# Patient Record
Sex: FEMALE | Race: WHITE | URBAN - METROPOLITAN AREA
[De-identification: names, ages, dates, MRNs, and addresses within clinical notes are randomized per-mention and may not be internally consistent; named-entity substitution may affect disease eponyms.]

---

## 2017-06-27 ENCOUNTER — OFFICE VISIT (OUTPATIENT)
Dept: ORTHOPEDIC SURGERY | Age: 77
End: 2017-06-27

## 2017-06-27 VITALS — HEIGHT: 59 IN | BODY MASS INDEX: 28.62 KG/M2 | WEIGHT: 141.98 LBS

## 2017-06-27 DIAGNOSIS — Z96.612 S/P REVERSE TOTAL SHOULDER ARTHROPLASTY, LEFT: Primary | ICD-10-CM

## 2017-06-27 PROCEDURE — 99213 OFFICE O/P EST LOW 20 MIN: CPT | Performed by: ORTHOPAEDIC SURGERY

## 2017-06-27 PROCEDURE — 73030 X-RAY EXAM OF SHOULDER: CPT | Performed by: ORTHOPAEDIC SURGERY

## 2017-06-27 RX ORDER — TRAMADOL HYDROCHLORIDE 50 MG/1
TABLET ORAL
COMMUNITY
Start: 2015-11-24

## 2021-03-04 ENCOUNTER — IMMUNIZATION (OUTPATIENT)
Dept: PRIMARY CARE CLINIC | Age: 81
End: 2021-03-04
Payer: MEDICARE

## 2021-03-04 PROCEDURE — 91300 COVID-19, PFIZER VACCINE 30MCG/0.3ML DOSE: CPT | Performed by: FAMILY MEDICINE

## 2021-03-04 PROCEDURE — 0001A COVID-19, PFIZER VACCINE 30MCG/0.3ML DOSE: CPT | Performed by: FAMILY MEDICINE

## 2021-03-25 ENCOUNTER — IMMUNIZATION (OUTPATIENT)
Dept: PRIMARY CARE CLINIC | Age: 81
End: 2021-03-25
Payer: MEDICARE

## 2021-03-25 PROCEDURE — 0002A COVID-19, PFIZER VACCINE 30MCG/0.3ML DOSE: CPT | Performed by: FAMILY MEDICINE

## 2021-03-25 PROCEDURE — 91300 COVID-19, PFIZER VACCINE 30MCG/0.3ML DOSE: CPT | Performed by: FAMILY MEDICINE

## 2023-02-23 ENCOUNTER — HOSPITAL ENCOUNTER (INPATIENT)
Age: 83
LOS: 3 days | Discharge: HOME | DRG: 305 | End: 2023-02-26
Attending: STUDENT IN AN ORGANIZED HEALTH CARE EDUCATION/TRAINING PROGRAM | Admitting: HOSPITALIST
Payer: MEDICARE

## 2023-02-23 ENCOUNTER — APPOINTMENT (OUTPATIENT)
Dept: GENERAL RADIOLOGY | Age: 83
DRG: 305 | End: 2023-02-23
Payer: MEDICARE

## 2023-02-23 ENCOUNTER — APPOINTMENT (OUTPATIENT)
Dept: CT IMAGING | Age: 83
DRG: 305 | End: 2023-02-23
Payer: MEDICARE

## 2023-02-23 DIAGNOSIS — J44.1 COPD EXACERBATION (HCC): Primary | ICD-10-CM

## 2023-02-23 DIAGNOSIS — R00.0 TACHYCARDIA: ICD-10-CM

## 2023-02-23 LAB
A/G RATIO: 1.3 (ref 1.1–2.2)
ALBUMIN SERPL-MCNC: 4.2 G/DL (ref 3.4–5)
ALP BLD-CCNC: 112 U/L (ref 40–129)
ALT SERPL-CCNC: 11 U/L (ref 10–40)
AMMONIA: 19 UMOL/L (ref 11–51)
ANION GAP SERPL CALCULATED.3IONS-SCNC: 15 MMOL/L (ref 3–16)
APTT: 26.7 SEC (ref 23–34.3)
AST SERPL-CCNC: 23 U/L (ref 15–37)
BASE EXCESS VENOUS: -0.3 MMOL/L (ref -3–3)
BASOPHILS ABSOLUTE: 0 K/UL (ref 0–0.2)
BASOPHILS RELATIVE PERCENT: 0.5 %
BILIRUB SERPL-MCNC: 0.7 MG/DL (ref 0–1)
BUN BLDV-MCNC: 20 MG/DL (ref 7–20)
CALCIUM IONIZED: 1.13 MMOL/L (ref 1.12–1.32)
CALCIUM SERPL-MCNC: 9.5 MG/DL (ref 8.3–10.6)
CALCIUM SERPL-MCNC: 9.9 MG/DL (ref 8.3–10.6)
CARBOXYHEMOGLOBIN: 1.3 % (ref 0–1.5)
CHLORIDE BLD-SCNC: 97 MMOL/L (ref 99–110)
CO2: 21 MMOL/L (ref 21–32)
CREAT SERPL-MCNC: 0.6 MG/DL (ref 0.6–1.2)
D DIMER: 1.19 UG/ML FEU (ref 0–0.6)
EKG ATRIAL RATE: 121 BPM
EKG DIAGNOSIS: NORMAL
EKG P AXIS: 71 DEGREES
EKG P-R INTERVAL: 156 MS
EKG Q-T INTERVAL: 332 MS
EKG QRS DURATION: 82 MS
EKG QTC CALCULATION (BAZETT): 471 MS
EKG R AXIS: -6 DEGREES
EKG T AXIS: 60 DEGREES
EKG VENTRICULAR RATE: 121 BPM
EOSINOPHILS ABSOLUTE: 0 K/UL (ref 0–0.6)
EOSINOPHILS RELATIVE PERCENT: 0.5 %
GFR SERPL CREATININE-BSD FRML MDRD: >60 ML/MIN/{1.73_M2}
GLUCOSE BLD-MCNC: 139 MG/DL (ref 70–99)
HCO3 VENOUS: 23.9 MMOL/L (ref 23–29)
HCT VFR BLD CALC: 36.8 % (ref 36–48)
HEMOGLOBIN: 12.3 G/DL (ref 12–16)
INR BLD: 0.99 (ref 0.87–1.14)
LACTIC ACID: 1.3 MMOL/L (ref 0.4–2)
LYMPHOCYTES ABSOLUTE: 1.2 K/UL (ref 1–5.1)
LYMPHOCYTES RELATIVE PERCENT: 13.4 %
MAGNESIUM: 1.5 MG/DL (ref 1.8–2.4)
MCH RBC QN AUTO: 30.9 PG (ref 26–34)
MCHC RBC AUTO-ENTMCNC: 33.5 G/DL (ref 31–36)
MCV RBC AUTO: 92.2 FL (ref 80–100)
METHEMOGLOBIN VENOUS: 0.3 %
MONOCYTES ABSOLUTE: 0.4 K/UL (ref 0–1.3)
MONOCYTES RELATIVE PERCENT: 4.5 %
NEUTROPHILS ABSOLUTE: 7.5 K/UL (ref 1.7–7.7)
NEUTROPHILS RELATIVE PERCENT: 81.1 %
O2 SAT, VEN: 91 %
O2 THERAPY: ABNORMAL
PCO2, VEN: 37.6 MMHG (ref 40–50)
PDW BLD-RTO: 13.6 % (ref 12.4–15.4)
PH VENOUS: 7.42 (ref 7.35–7.45)
PH VENOUS: 7.45 (ref 7.35–7.45)
PLATELET # BLD: 330 K/UL (ref 135–450)
PMV BLD AUTO: 7.2 FL (ref 5–10.5)
PO2, VEN: 57.9 MMHG (ref 25–40)
POTASSIUM SERPL-SCNC: 3.8 MMOL/L (ref 3.5–5.1)
PRO-BNP: 324 PG/ML (ref 0–449)
PROCALCITONIN: 0.1 NG/ML (ref 0–0.15)
PROTHROMBIN TIME: 13 SEC (ref 11.7–14.5)
RBC # BLD: 4 M/UL (ref 4–5.2)
SODIUM BLD-SCNC: 133 MMOL/L (ref 136–145)
TCO2 CALC VENOUS: 25 MMOL/L
TOTAL PROTEIN: 7.5 G/DL (ref 6.4–8.2)
TROPONIN: <0.01 NG/ML
TROPONIN: <0.01 NG/ML
WBC # BLD: 9.3 K/UL (ref 4–11)

## 2023-02-23 PROCEDURE — 99285 EMERGENCY DEPT VISIT HI MDM: CPT

## 2023-02-23 PROCEDURE — 6370000000 HC RX 637 (ALT 250 FOR IP): Performed by: HOSPITALIST

## 2023-02-23 PROCEDURE — 6370000000 HC RX 637 (ALT 250 FOR IP): Performed by: PHYSICIAN ASSISTANT

## 2023-02-23 PROCEDURE — 85730 THROMBOPLASTIN TIME PARTIAL: CPT

## 2023-02-23 PROCEDURE — 93005 ELECTROCARDIOGRAM TRACING: CPT | Performed by: PHYSICIAN ASSISTANT

## 2023-02-23 PROCEDURE — 85379 FIBRIN DEGRADATION QUANT: CPT

## 2023-02-23 PROCEDURE — 83605 ASSAY OF LACTIC ACID: CPT

## 2023-02-23 PROCEDURE — 6360000002 HC RX W HCPCS: Performed by: PHYSICIAN ASSISTANT

## 2023-02-23 PROCEDURE — 84484 ASSAY OF TROPONIN QUANT: CPT

## 2023-02-23 PROCEDURE — 93010 ELECTROCARDIOGRAM REPORT: CPT | Performed by: INTERNAL MEDICINE

## 2023-02-23 PROCEDURE — 36415 COLL VENOUS BLD VENIPUNCTURE: CPT

## 2023-02-23 PROCEDURE — 1200000000 HC SEMI PRIVATE

## 2023-02-23 PROCEDURE — 6360000002 HC RX W HCPCS: Performed by: HOSPITALIST

## 2023-02-23 PROCEDURE — 83735 ASSAY OF MAGNESIUM: CPT

## 2023-02-23 PROCEDURE — 82140 ASSAY OF AMMONIA: CPT

## 2023-02-23 PROCEDURE — 2580000003 HC RX 258: Performed by: PHYSICIAN ASSISTANT

## 2023-02-23 PROCEDURE — 83880 ASSAY OF NATRIURETIC PEPTIDE: CPT

## 2023-02-23 PROCEDURE — 93005 ELECTROCARDIOGRAM TRACING: CPT | Performed by: HOSPITALIST

## 2023-02-23 PROCEDURE — 84443 ASSAY THYROID STIM HORMONE: CPT

## 2023-02-23 PROCEDURE — HZ2ZZZZ DETOXIFICATION SERVICES FOR SUBSTANCE ABUSE TREATMENT: ICD-10-PCS | Performed by: HOSPITALIST

## 2023-02-23 PROCEDURE — 2580000003 HC RX 258: Performed by: HOSPITALIST

## 2023-02-23 PROCEDURE — 84145 PROCALCITONIN (PCT): CPT

## 2023-02-23 PROCEDURE — 85025 COMPLETE CBC W/AUTO DIFF WBC: CPT

## 2023-02-23 PROCEDURE — 6360000004 HC RX CONTRAST MEDICATION: Performed by: PHYSICIAN ASSISTANT

## 2023-02-23 PROCEDURE — 82803 BLOOD GASES ANY COMBINATION: CPT

## 2023-02-23 PROCEDURE — 96374 THER/PROPH/DIAG INJ IV PUSH: CPT

## 2023-02-23 PROCEDURE — 71045 X-RAY EXAM CHEST 1 VIEW: CPT

## 2023-02-23 PROCEDURE — 71260 CT THORAX DX C+: CPT | Performed by: PHYSICIAN ASSISTANT

## 2023-02-23 PROCEDURE — 85610 PROTHROMBIN TIME: CPT

## 2023-02-23 PROCEDURE — 82607 VITAMIN B-12: CPT

## 2023-02-23 PROCEDURE — 83036 HEMOGLOBIN GLYCOSYLATED A1C: CPT

## 2023-02-23 PROCEDURE — 82330 ASSAY OF CALCIUM: CPT

## 2023-02-23 PROCEDURE — 82310 ASSAY OF CALCIUM: CPT

## 2023-02-23 PROCEDURE — 80053 COMPREHEN METABOLIC PANEL: CPT

## 2023-02-23 RX ORDER — LORAZEPAM 2 MG/ML
2 INJECTION INTRAMUSCULAR
Status: DISCONTINUED | OUTPATIENT
Start: 2023-02-23 | End: 2023-02-26 | Stop reason: HOSPADM

## 2023-02-23 RX ORDER — ACETAMINOPHEN 325 MG/1
650 TABLET ORAL EVERY 6 HOURS PRN
Status: DISCONTINUED | OUTPATIENT
Start: 2023-02-23 | End: 2023-02-26 | Stop reason: HOSPADM

## 2023-02-23 RX ORDER — MAGNESIUM SULFATE 1 G/100ML
1000 INJECTION INTRAVENOUS
Status: COMPLETED | OUTPATIENT
Start: 2023-02-23 | End: 2023-02-24

## 2023-02-23 RX ORDER — ENOXAPARIN SODIUM 100 MG/ML
40 INJECTION SUBCUTANEOUS DAILY
Status: DISCONTINUED | OUTPATIENT
Start: 2023-02-23 | End: 2023-02-26 | Stop reason: HOSPADM

## 2023-02-23 RX ORDER — SODIUM CHLORIDE 0.9 % (FLUSH) 0.9 %
10 SYRINGE (ML) INJECTION EVERY 12 HOURS SCHEDULED
Status: DISCONTINUED | OUTPATIENT
Start: 2023-02-23 | End: 2023-02-23 | Stop reason: SDUPTHER

## 2023-02-23 RX ORDER — METHYLPREDNISOLONE SODIUM SUCCINATE 125 MG/2ML
125 INJECTION, POWDER, LYOPHILIZED, FOR SOLUTION INTRAMUSCULAR; INTRAVENOUS ONCE
Status: COMPLETED | OUTPATIENT
Start: 2023-02-23 | End: 2023-02-23

## 2023-02-23 RX ORDER — LORAZEPAM 2 MG/ML
1 INJECTION INTRAMUSCULAR
Status: DISCONTINUED | OUTPATIENT
Start: 2023-02-23 | End: 2023-02-26 | Stop reason: HOSPADM

## 2023-02-23 RX ORDER — IPRATROPIUM BROMIDE AND ALBUTEROL SULFATE 2.5; .5 MG/3ML; MG/3ML
2 SOLUTION RESPIRATORY (INHALATION) ONCE
Status: COMPLETED | OUTPATIENT
Start: 2023-02-23 | End: 2023-02-23

## 2023-02-23 RX ORDER — LORAZEPAM 1 MG/1
2 TABLET ORAL
Status: DISCONTINUED | OUTPATIENT
Start: 2023-02-23 | End: 2023-02-26 | Stop reason: HOSPADM

## 2023-02-23 RX ORDER — SODIUM CHLORIDE 0.9 % (FLUSH) 0.9 %
10 SYRINGE (ML) INJECTION PRN
Status: DISCONTINUED | OUTPATIENT
Start: 2023-02-23 | End: 2023-02-23 | Stop reason: SDUPTHER

## 2023-02-23 RX ORDER — LORAZEPAM 1 MG/1
4 TABLET ORAL
Status: DISCONTINUED | OUTPATIENT
Start: 2023-02-23 | End: 2023-02-26 | Stop reason: HOSPADM

## 2023-02-23 RX ORDER — SODIUM CHLORIDE 9 MG/ML
INJECTION, SOLUTION INTRAVENOUS CONTINUOUS
Status: ACTIVE | OUTPATIENT
Start: 2023-02-23 | End: 2023-02-24

## 2023-02-23 RX ORDER — LORAZEPAM 2 MG/ML
3 INJECTION INTRAMUSCULAR
Status: DISCONTINUED | OUTPATIENT
Start: 2023-02-23 | End: 2023-02-26 | Stop reason: HOSPADM

## 2023-02-23 RX ORDER — SODIUM CHLORIDE 9 MG/ML
INJECTION, SOLUTION INTRAVENOUS PRN
Status: DISCONTINUED | OUTPATIENT
Start: 2023-02-23 | End: 2023-02-26 | Stop reason: HOSPADM

## 2023-02-23 RX ORDER — LEVALBUTEROL 1.25 MG/.5ML
0.63 SOLUTION, CONCENTRATE RESPIRATORY (INHALATION) EVERY 8 HOURS PRN
Status: DISCONTINUED | OUTPATIENT
Start: 2023-02-23 | End: 2023-02-26 | Stop reason: HOSPADM

## 2023-02-23 RX ORDER — SODIUM CHLORIDE 0.9 % (FLUSH) 0.9 %
10 SYRINGE (ML) INJECTION EVERY 12 HOURS SCHEDULED
Status: DISCONTINUED | OUTPATIENT
Start: 2023-02-23 | End: 2023-02-26 | Stop reason: HOSPADM

## 2023-02-23 RX ORDER — DOXYCYCLINE HYCLATE 100 MG
100 TABLET ORAL EVERY 12 HOURS
Status: DISCONTINUED | OUTPATIENT
Start: 2023-02-23 | End: 2023-02-26

## 2023-02-23 RX ORDER — OXYMETAZOLINE HYDROCHLORIDE 0.05 G/100ML
2 SPRAY NASAL ONCE
Status: COMPLETED | OUTPATIENT
Start: 2023-02-23 | End: 2023-02-23

## 2023-02-23 RX ORDER — M-VIT,TX,IRON,MINS/CALC/FOLIC 27MG-0.4MG
1 TABLET ORAL DAILY
Status: DISCONTINUED | OUTPATIENT
Start: 2023-02-23 | End: 2023-02-23

## 2023-02-23 RX ORDER — METHYLPREDNISOLONE SODIUM SUCCINATE 40 MG/ML
40 INJECTION, POWDER, LYOPHILIZED, FOR SOLUTION INTRAMUSCULAR; INTRAVENOUS EVERY 6 HOURS
Status: DISCONTINUED | OUTPATIENT
Start: 2023-02-23 | End: 2023-02-23

## 2023-02-23 RX ORDER — LORAZEPAM 2 MG/ML
4 INJECTION INTRAMUSCULAR
Status: DISCONTINUED | OUTPATIENT
Start: 2023-02-23 | End: 2023-02-26 | Stop reason: HOSPADM

## 2023-02-23 RX ORDER — ACETAMINOPHEN 650 MG/1
650 SUPPOSITORY RECTAL EVERY 6 HOURS PRN
Status: DISCONTINUED | OUTPATIENT
Start: 2023-02-23 | End: 2023-02-26 | Stop reason: HOSPADM

## 2023-02-23 RX ORDER — LEVALBUTEROL 1.25 MG/.5ML
0.63 SOLUTION, CONCENTRATE RESPIRATORY (INHALATION)
Status: DISCONTINUED | OUTPATIENT
Start: 2023-02-23 | End: 2023-02-23

## 2023-02-23 RX ORDER — FLUTICASONE PROPIONATE 50 MCG
1 SPRAY, SUSPENSION (ML) NASAL DAILY
Status: DISCONTINUED | OUTPATIENT
Start: 2023-02-24 | End: 2023-02-26 | Stop reason: HOSPADM

## 2023-02-23 RX ORDER — PREDNISONE 20 MG/1
40 TABLET ORAL DAILY
Status: DISCONTINUED | OUTPATIENT
Start: 2023-02-26 | End: 2023-02-23

## 2023-02-23 RX ORDER — LANOLIN ALCOHOL/MO/W.PET/CERES
100 CREAM (GRAM) TOPICAL DAILY
Status: DISCONTINUED | OUTPATIENT
Start: 2023-02-23 | End: 2023-02-23

## 2023-02-23 RX ORDER — SODIUM CHLORIDE 0.9 % (FLUSH) 0.9 %
10 SYRINGE (ML) INJECTION PRN
Status: DISCONTINUED | OUTPATIENT
Start: 2023-02-23 | End: 2023-02-26 | Stop reason: HOSPADM

## 2023-02-23 RX ORDER — POLYETHYLENE GLYCOL 3350 17 G/17G
17 POWDER, FOR SOLUTION ORAL DAILY PRN
Status: DISCONTINUED | OUTPATIENT
Start: 2023-02-23 | End: 2023-02-26 | Stop reason: HOSPADM

## 2023-02-23 RX ORDER — LORAZEPAM 1 MG/1
3 TABLET ORAL
Status: DISCONTINUED | OUTPATIENT
Start: 2023-02-23 | End: 2023-02-26 | Stop reason: HOSPADM

## 2023-02-23 RX ORDER — SENNA PLUS 8.6 MG/1
1 TABLET ORAL DAILY PRN
Status: DISCONTINUED | OUTPATIENT
Start: 2023-02-23 | End: 2023-02-26 | Stop reason: HOSPADM

## 2023-02-23 RX ORDER — 0.9 % SODIUM CHLORIDE 0.9 %
1000 INTRAVENOUS SOLUTION INTRAVENOUS ONCE
Status: COMPLETED | OUTPATIENT
Start: 2023-02-23 | End: 2023-02-23

## 2023-02-23 RX ORDER — LORAZEPAM 1 MG/1
1 TABLET ORAL
Status: DISCONTINUED | OUTPATIENT
Start: 2023-02-23 | End: 2023-02-26 | Stop reason: HOSPADM

## 2023-02-23 RX ADMIN — MAGNESIUM SULFATE HEPTAHYDRATE 1000 MG: 1 INJECTION, SOLUTION INTRAVENOUS at 22:27

## 2023-02-23 RX ADMIN — IPRATROPIUM BROMIDE AND ALBUTEROL SULFATE 2 AMPULE: 2.5; .5 SOLUTION RESPIRATORY (INHALATION) at 13:00

## 2023-02-23 RX ADMIN — ENOXAPARIN SODIUM 40 MG: 100 INJECTION SUBCUTANEOUS at 20:39

## 2023-02-23 RX ADMIN — METHYLPREDNISOLONE SODIUM SUCCINATE 125 MG: 125 INJECTION, POWDER, FOR SOLUTION INTRAMUSCULAR; INTRAVENOUS at 13:41

## 2023-02-23 RX ADMIN — IOPAMIDOL 75 ML: 755 INJECTION, SOLUTION INTRAVENOUS at 14:00

## 2023-02-23 RX ADMIN — METHYLPREDNISOLONE SODIUM SUCCINATE 40 MG: 40 INJECTION, POWDER, FOR SOLUTION INTRAMUSCULAR; INTRAVENOUS at 20:39

## 2023-02-23 RX ADMIN — Medication 1 TABLET: at 20:38

## 2023-02-23 RX ADMIN — LORAZEPAM 1 MG: 1 TABLET ORAL at 22:32

## 2023-02-23 RX ADMIN — Medication 100 MG: at 20:39

## 2023-02-23 RX ADMIN — LEVALBUTEROL HYDROCHLORIDE 0.63 MG: 1.25 SOLUTION, CONCENTRATE RESPIRATORY (INHALATION) at 20:47

## 2023-02-23 RX ADMIN — SODIUM CHLORIDE 1000 ML: 9 INJECTION, SOLUTION INTRAVENOUS at 15:41

## 2023-02-23 RX ADMIN — OXYMETAZOLINE HCL 2 SPRAY: 0.05 SPRAY NASAL at 17:38

## 2023-02-23 RX ADMIN — DOXYCYCLINE HYCLATE 100 MG: 100 TABLET, COATED ORAL at 20:39

## 2023-02-23 RX ADMIN — SODIUM CHLORIDE: 9 INJECTION, SOLUTION INTRAVENOUS at 20:38

## 2023-02-23 ASSESSMENT — PAIN - FUNCTIONAL ASSESSMENT
PAIN_FUNCTIONAL_ASSESSMENT: NONE - DENIES PAIN

## 2023-02-23 NOTE — ED NOTES
RN called lab and spoke to [employer] regarding Troponin not being in process. She states is going to process the sample now.      Lazarus Osgood, RN  02/23/23 9598

## 2023-02-23 NOTE — ED NOTES
Patient continues to rest in bed at this time. No current needs. Daughter remains at bedside and call light remains within reach.      Kira Su RN  02/23/23 8670

## 2023-02-23 NOTE — ED NOTES
Ambulated pt with portable SpO2 monitor. While resting pt had HR of ~123 and O2 at 97%. While ambulating pt had HR of ~134 and O2 at 98%. Pt did not have any complaints while ambulating. Pt ambulated well with a steady gait, and did not require any assistance from  tech.       Jef Flores  02/23/23 1525

## 2023-02-23 NOTE — ED NOTES
RN called lab regarding trop, d-dimer and BNP not being in process, spoke to [employer] who states she has \"found samples and they are in process now\".      Kira Su RN  02/23/23 0367

## 2023-02-23 NOTE — ED NOTES
Patient resting in bed comfortably at this time. Call light within reach. Daughter at bedside. No current needs.      Kira Su RN  02/23/23 5515

## 2023-02-24 LAB
A/G RATIO: 1.2 (ref 1.1–2.2)
ALBUMIN SERPL-MCNC: 3.7 G/DL (ref 3.4–5)
ALP BLD-CCNC: 104 U/L (ref 40–129)
ALT SERPL-CCNC: 9 U/L (ref 10–40)
AMPHETAMINE SCREEN, URINE: NORMAL
ANION GAP SERPL CALCULATED.3IONS-SCNC: 12 MMOL/L (ref 3–16)
AST SERPL-CCNC: 16 U/L (ref 15–37)
BARBITURATE SCREEN URINE: NORMAL
BASOPHILS ABSOLUTE: 0 K/UL (ref 0–0.2)
BASOPHILS RELATIVE PERCENT: 0.1 %
BENZODIAZEPINE SCREEN, URINE: NORMAL
BILIRUB SERPL-MCNC: 0.4 MG/DL (ref 0–1)
BUN BLDV-MCNC: 15 MG/DL (ref 7–20)
CALCIUM SERPL-MCNC: 9.3 MG/DL (ref 8.3–10.6)
CANNABINOID SCREEN URINE: NORMAL
CHLORIDE BLD-SCNC: 97 MMOL/L (ref 99–110)
CO2: 25 MMOL/L (ref 21–32)
COCAINE METABOLITE SCREEN URINE: NORMAL
CREAT SERPL-MCNC: <0.5 MG/DL (ref 0.6–1.2)
EKG ATRIAL RATE: 113 BPM
EKG DIAGNOSIS: NORMAL
EKG P AXIS: 73 DEGREES
EKG P-R INTERVAL: 172 MS
EKG Q-T INTERVAL: 352 MS
EKG QRS DURATION: 86 MS
EKG QTC CALCULATION (BAZETT): 482 MS
EKG R AXIS: -44 DEGREES
EKG T AXIS: 51 DEGREES
EKG VENTRICULAR RATE: 113 BPM
EOSINOPHILS ABSOLUTE: 0 K/UL (ref 0–0.6)
EOSINOPHILS RELATIVE PERCENT: 0 %
ESTIMATED AVERAGE GLUCOSE: 102.5 MG/DL
FENTANYL SCREEN, URINE: NORMAL
GFR SERPL CREATININE-BSD FRML MDRD: >60 ML/MIN/{1.73_M2}
GLUCOSE BLD-MCNC: 133 MG/DL (ref 70–99)
HBA1C MFR BLD: 5.2 %
HCT VFR BLD CALC: 35.6 % (ref 36–48)
HEMOGLOBIN: 11.4 G/DL (ref 12–16)
LACTIC ACID: 0.9 MMOL/L (ref 0.4–2)
LIPASE: 17 U/L (ref 13–60)
LV EF: 58 %
LVEF MODALITY: NORMAL
LYMPHOCYTES ABSOLUTE: 1.1 K/UL (ref 1–5.1)
LYMPHOCYTES RELATIVE PERCENT: 14.2 %
Lab: NORMAL
MCH RBC QN AUTO: 29.4 PG (ref 26–34)
MCHC RBC AUTO-ENTMCNC: 32 G/DL (ref 31–36)
MCV RBC AUTO: 91.8 FL (ref 80–100)
METHADONE SCREEN, URINE: NORMAL
MONOCYTES ABSOLUTE: 0.3 K/UL (ref 0–1.3)
MONOCYTES RELATIVE PERCENT: 4.1 %
NEUTROPHILS ABSOLUTE: 6.3 K/UL (ref 1.7–7.7)
NEUTROPHILS RELATIVE PERCENT: 81.6 %
OPIATE SCREEN URINE: NORMAL
OXYCODONE URINE: NORMAL
PDW BLD-RTO: 13.7 % (ref 12.4–15.4)
PH UA: 6
PHENCYCLIDINE SCREEN URINE: NORMAL
PLATELET # BLD: 336 K/UL (ref 135–450)
PMV BLD AUTO: 7.2 FL (ref 5–10.5)
POTASSIUM REFLEX MAGNESIUM: 3.6 MMOL/L (ref 3.5–5.1)
PROCALCITONIN: 0.1 NG/ML (ref 0–0.15)
RBC # BLD: 3.88 M/UL (ref 4–5.2)
SODIUM BLD-SCNC: 134 MMOL/L (ref 136–145)
TOTAL PROTEIN: 6.9 G/DL (ref 6.4–8.2)
TSH SERPL DL<=0.05 MIU/L-ACNC: 0.64 UIU/ML (ref 0.27–4.2)
VITAMIN B-12: 773 PG/ML (ref 211–911)
WBC # BLD: 7.7 K/UL (ref 4–11)

## 2023-02-24 PROCEDURE — C8929 TTE W OR WO FOL WCON,DOPPLER: HCPCS

## 2023-02-24 PROCEDURE — 83605 ASSAY OF LACTIC ACID: CPT

## 2023-02-24 PROCEDURE — 80053 COMPREHEN METABOLIC PANEL: CPT

## 2023-02-24 PROCEDURE — 6370000000 HC RX 637 (ALT 250 FOR IP): Performed by: INTERNAL MEDICINE

## 2023-02-24 PROCEDURE — 84145 PROCALCITONIN (PCT): CPT

## 2023-02-24 PROCEDURE — 93010 ELECTROCARDIOGRAM REPORT: CPT | Performed by: INTERNAL MEDICINE

## 2023-02-24 PROCEDURE — 2500000003 HC RX 250 WO HCPCS: Performed by: INTERNAL MEDICINE

## 2023-02-24 PROCEDURE — 2580000003 HC RX 258: Performed by: HOSPITALIST

## 2023-02-24 PROCEDURE — 1200000000 HC SEMI PRIVATE

## 2023-02-24 PROCEDURE — 85025 COMPLETE CBC W/AUTO DIFF WBC: CPT

## 2023-02-24 PROCEDURE — 6370000000 HC RX 637 (ALT 250 FOR IP): Performed by: HOSPITALIST

## 2023-02-24 PROCEDURE — 36415 COLL VENOUS BLD VENIPUNCTURE: CPT

## 2023-02-24 PROCEDURE — 83690 ASSAY OF LIPASE: CPT

## 2023-02-24 PROCEDURE — 2500000003 HC RX 250 WO HCPCS: Performed by: HOSPITALIST

## 2023-02-24 PROCEDURE — 6360000002 HC RX W HCPCS: Performed by: HOSPITALIST

## 2023-02-24 PROCEDURE — 6360000002 HC RX W HCPCS: Performed by: INTERNAL MEDICINE

## 2023-02-24 PROCEDURE — 80307 DRUG TEST PRSMV CHEM ANLYZR: CPT

## 2023-02-24 RX ORDER — HYDROCHLOROTHIAZIDE 25 MG/1
12.5 TABLET ORAL DAILY
Status: DISCONTINUED | OUTPATIENT
Start: 2023-02-24 | End: 2023-02-26 | Stop reason: HOSPADM

## 2023-02-24 RX ORDER — LABETALOL HYDROCHLORIDE 5 MG/ML
10 INJECTION, SOLUTION INTRAVENOUS EVERY 4 HOURS PRN
Status: DISCONTINUED | OUTPATIENT
Start: 2023-02-24 | End: 2023-02-26 | Stop reason: HOSPADM

## 2023-02-24 RX ORDER — LOSARTAN POTASSIUM 100 MG/1
100 TABLET ORAL DAILY
Status: DISCONTINUED | OUTPATIENT
Start: 2023-02-24 | End: 2023-02-26 | Stop reason: HOSPADM

## 2023-02-24 RX ORDER — AMLODIPINE BESYLATE 5 MG/1
5 TABLET ORAL DAILY
Status: DISCONTINUED | OUTPATIENT
Start: 2023-02-24 | End: 2023-02-25

## 2023-02-24 RX ORDER — ONDANSETRON 2 MG/ML
4 INJECTION INTRAMUSCULAR; INTRAVENOUS EVERY 6 HOURS PRN
Status: DISCONTINUED | OUTPATIENT
Start: 2023-02-24 | End: 2023-02-26 | Stop reason: HOSPADM

## 2023-02-24 RX ADMIN — SODIUM CHLORIDE, PRESERVATIVE FREE 10 ML: 5 INJECTION INTRAVENOUS at 20:42

## 2023-02-24 RX ADMIN — LOSARTAN POTASSIUM 100 MG: 100 TABLET, FILM COATED ORAL at 12:07

## 2023-02-24 RX ADMIN — DOXYCYCLINE HYCLATE 100 MG: 100 TABLET, COATED ORAL at 20:24

## 2023-02-24 RX ADMIN — AMLODIPINE BESYLATE 5 MG: 5 TABLET ORAL at 20:24

## 2023-02-24 RX ADMIN — MAGNESIUM SULFATE HEPTAHYDRATE 1000 MG: 1 INJECTION, SOLUTION INTRAVENOUS at 00:04

## 2023-02-24 RX ADMIN — DOXYCYCLINE HYCLATE 100 MG: 100 TABLET, COATED ORAL at 08:53

## 2023-02-24 RX ADMIN — HYDROCHLOROTHIAZIDE 12.5 MG: 25 TABLET ORAL at 12:07

## 2023-02-24 RX ADMIN — FLUTICASONE PROPIONATE 1 SPRAY: 50 SPRAY, METERED NASAL at 20:27

## 2023-02-24 RX ADMIN — SODIUM CHLORIDE, PRESERVATIVE FREE 10 ML: 5 INJECTION INTRAVENOUS at 09:56

## 2023-02-24 RX ADMIN — ACETAMINOPHEN 650 MG: 325 TABLET ORAL at 21:54

## 2023-02-24 RX ADMIN — MAGNESIUM SULFATE HEPTAHYDRATE 1000 MG: 1 INJECTION, SOLUTION INTRAVENOUS at 01:16

## 2023-02-24 RX ADMIN — LABETALOL HYDROCHLORIDE 10 MG: 5 INJECTION INTRAVENOUS at 21:55

## 2023-02-24 RX ADMIN — ONDANSETRON 4 MG: 2 INJECTION INTRAMUSCULAR; INTRAVENOUS at 09:19

## 2023-02-24 RX ADMIN — ENOXAPARIN SODIUM 40 MG: 100 INJECTION SUBCUTANEOUS at 08:53

## 2023-02-24 RX ADMIN — LORAZEPAM 1 MG: 2 INJECTION INTRAMUSCULAR; INTRAVENOUS at 12:46

## 2023-02-24 RX ADMIN — ACETAMINOPHEN 650 MG: 325 TABLET ORAL at 06:53

## 2023-02-24 RX ADMIN — LORAZEPAM 1 MG: 1 TABLET ORAL at 20:41

## 2023-02-24 RX ADMIN — FOLIC ACID: 5 INJECTION, SOLUTION INTRAMUSCULAR; INTRAVENOUS; SUBCUTANEOUS at 09:53

## 2023-02-24 ASSESSMENT — PAIN DESCRIPTION - LOCATION
LOCATION: HEAD
LOCATION: HEAD

## 2023-02-24 ASSESSMENT — PAIN DESCRIPTION - DESCRIPTORS: DESCRIPTORS: CRAMPING;CRUSHING

## 2023-02-24 ASSESSMENT — PAIN SCALES - GENERAL
PAINLEVEL_OUTOF10: 5
PAINLEVEL_OUTOF10: 3

## 2023-02-24 ASSESSMENT — PAIN DESCRIPTION - ORIENTATION: ORIENTATION: INNER

## 2023-02-24 NOTE — RT PROTOCOL NOTE
RT Inhaler-Nebulizer Bronchodilator Protocol Note    There is a bronchodilator order in the chart from a provider indicating to follow the RT Bronchodilator Protocol and there is an “Initiate RT Inhaler-Nebulizer Bronchodilator Protocol” order as well (see protocol at bottom of note).    CXR Findings:  XR CHEST PORTABLE    Result Date: 2/23/2023  No radiographic evidence of an acute cardiopulmonary process. COPD.       The findings from the last RT Protocol Assessment were as follows:   History Pulmonary Disease: None or smoker <15 pack years  Respiratory Pattern: Regular pattern and RR 12-20 bpm  Breath Sounds: Clear breath sounds  Cough: Strong, spontaneous, non-productive  Indication for Bronchodilator Therapy: None  Bronchodilator Assessment Score: 0    Aerosolized bronchodilator medication orders have been revised according to the RT Inhaler-Nebulizer Bronchodilator Protocol below.    Respiratory Therapist to perform RT Therapy Protocol Assessment initially then follow the protocol.  Repeat RT Therapy Protocol Assessment PRN for score 0-3 or on second treatment, BID, and PRN for scores above 3.    No Indications - adjust the frequency to every 6 hours PRN wheezing or bronchospasm, if no treatments needed after 48 hours then discontinue using Per Protocol order mode.     If indication present, adjust the RT bronchodilator orders based on the Bronchodilator Assessment Score as indicated below.  Use Inhaler orders unless patient has one or more of the following: on home nebulizer, not able to hold breath for 10 seconds, is not alert and oriented, cannot activate and use MDI correctly, or respiratory rate 25 breaths per minute or more, then use the equivalent nebulizer order(s) with same Frequency and PRN reasons based on the score.  If a patient is on this medication at home then do not decrease Frequency below that used at home.    0-3 - enter or revise RT bronchodilator order(s) to equivalent RT Bronchodilator  order with Frequency of every 4 hours PRN for wheezing or increased work of breathing using Per Protocol order mode. 4-6 - enter or revise RT Bronchodilator order(s) to two equivalent RT bronchodilator orders with one order with BID Frequency and one order with Frequency of every 4 hours PRN wheezing or increased work of breathing using Per Protocol order mode. 7-10 - enter or revise RT Bronchodilator order(s) to two equivalent RT bronchodilator orders with one order with TID Frequency and one order with Frequency of every 4 hours PRN wheezing or increased work of breathing using Per Protocol order mode. 11-13 - enter or revise RT Bronchodilator order(s) to one equivalent RT bronchodilator order with QID Frequency and an Albuterol order with Frequency of every 4 hours PRN wheezing or increased work of breathing using Per Protocol order mode. Greater than 13 - enter or revise RT Bronchodilator order(s) to one equivalent RT bronchodilator order with every 4 hours Frequency and an Albuterol order with Frequency of every 2 hours PRN wheezing or increased work of breathing using Per Protocol order mode. RT to enter RT Home Evaluation for COPD & MDI Assessment order using Per Protocol order mode.     Electronically signed by Marietta Garzon RCP on 2/24/2023 at 1:09 AM

## 2023-02-24 NOTE — H&P
HOSPITALISTS HISTORY AND PHYSICAL    2/23/2023 7:15 PM    Patient Information:  Elizabeth Kwon is a 78 y.o. female [id number]  PCP:  Domenica Chowdhury MD (Tel: [phone] )    Chief complaint:    Chief Complaint   Patient presents with    Shortness of Breath     Patient woke up at 6am with trouble taking a deep breath. Patient states she took at home COVID test which was negative and went to urgent care who sent her to the ED. Patient states she cannot sit, she has to get up to try and take a deep breath. History of Present Illness:  Carlin Bhakta is a 78 y.o. female who presented to the ED to be evaluated for sudden onset dyspnea which awakened her from sleep at 6 AM this morning. Patient denies chest pain, productive cough, fever/chills. She reports that she took a home COVID test which was negative, after which she made an appointment for [facility]. When she arrived for said appointment, it was recommended that she be transported to the hospital for further evaluation. Patient reports that she has no history of asthma or COPD, nor has she ever smoked tobacco.  There is no previous history of CHF, CAD, or other cardiac issues. Patient does endorse drinking 1-2 cocktails daily for several years. Upon arrival to the ED EKG was obtained and notable for sinus tachycardia with age-indeterminate septal infarct. CT chest negative for pulmonary embolism but did demonstrate groundglass opacification in BLL possibility for atelectasis, pneumonitis, or atypical infection. Notable labs include: Hyponatremia 133, hypochloremia 97, hypomagnesemia 1.4, borderline hyperglycemia 139, and D-dimer elevated 1.19. Patient received high-dose Solu-Medrol 125 mg as well as DuoNeb therapy to treat suspected COPD exacerbation.   Patient's respiratory status improved completely, however she was then noted to have sinus tachycardia therefore request for admission placed. History obtained from patient and review of [facility]     REVIEW OF SYSTEMS:   Constitutional: Negative for fever,chills, and generalized weakness  ENT: Negative for headache, rhinorrhea, and sore throat. Respiratory: Positive for shortness of breath and air hunger; denies cough and wheeze  Cardiovascular: Negative for chest pain, palpitations, peripheral edema, orthopnea or PND  Gastrointestinal: Negative for N/V/D and abdominal pain; no hematemesis, hematochezia, or melena; no anorexia  Genitourinary: Negative for dysuria, frequency, retention; no incontinence  Hematologic/Lymphatic: Negative for bleeding tendency/excessive bruising  Musculoskeletal: Negative for myalgias and arthalgias; able to ambulate without difficulty  Neurologic: Negative for LOC, seizure activity, paresthesias, dysarthria, vertigo, and gait disturbance  Skin: Negative for itching,rash, decubitus  Psychiatric: Positive daily EtOH consumption; positive anxiety  Endocrine: Negative for polyuria/polydipsia/polyphagia; no heat/cold intolerance    Past Medical History:   has a past medical history of Arthritis, COPD with acute exacerbation ([facility] [address] Hyperlipidemia, and Hypertension. Past Surgical History:   has a past surgical history that includes hernia repair (12/15); Hysterectomy; Small intestine surgery (9/15); Cholecystectomy; Colonoscopy; Breast surgery; Shoulder arthroscopy; skin biopsy; Carpal tunnel release (Left); and joint replacement (Left, 06/16/16). Medications:  No current facility-administered medications on file prior to encounter.      Current Outpatient Medications on File Prior to Encounter   Medication Sig Dispense Refill    traMADol (ULTRAM) 50 MG tablet TAKE ONE TO TWO TABLETS BY MOUTH EVERY 6 HOURS AS NEEDED FOR PAIN      oxyCODONE-acetaminophen (PERCOCET) 5-325 MG per tablet Take 1-2 tablets by mouth every 4-6 hrs prn pain 50 tablet 0    ondansetron (ZOFRAN) 4 MG tablet Take 1 tablet by mouth every 8 hours as needed for Nausea or Vomiting 40 tablet 0    ranitidine (ZANTAC) 150 MG tablet Take 150 mg by mouth 2 times daily      tiZANidine (ZANAFLEX) 4 MG tablet Take 4 mg by mouth      aspirin 81 MG chewable tablet Take by mouth      Calcium-Vitamin D 500-100 MG-UNIT WAFR Take by mouth      cetirizine (ZYRTEC) 10 MG tablet Take by mouth      fenofibrate micronized (ANTARA) 130 MG capsule Take 130 mg by mouth      LORazepam (ATIVAN) 0.5 MG tablet Take 0.5 mg by mouth      losartan-hydrochlorothiazide (HYZAAR) 100-12.5 MG per tablet 1 tablet TAKE TWO TABLETS BY MOUTH DAILY      nabumetone (RELAFEN) 500 MG tablet Take 500 mg by mouth      pravastatin (PRAVACHOL) 80 MG tablet Take 80 mg by mouth         Allergies: Allergies   Allergen Reactions    Atorvastatin     Codeine     Lisinopril      cough    Penicillins     Sulfa Antibiotics Hives        Social History:   reports that she has never smoked. She has never used smokeless tobacco. She reports current alcohol use of about 5.0 standard drinks per week. She reports that she does not use drugs. Family History:  family history is not on file.      Physical Exam:  BP (!) 175/88   Pulse (!) 114   Temp 98 °F (36.7 °C) (Oral)   Resp 17   Wt 160 lb (72.6 kg)   SpO2 95%   BMI 32.26 kg/m²     General appearance: Pleasant elderly female resting in bed with daughter by her side  Eyes: Sclera clear without conjunctival injection; PERRLA; EOMI  ENT: Mucous membranes moist without thrush; normal dentition  Neck: Supple without meningismus; no goiter; no carotid bruit bilaterally  Cardiovascular: Tachycardic  rhythm without ectopy; normal S1-S2 with no murmurs; no peripheral edema; no JVD  Respiratory: Mild tachypnea; diminished bibasilar breath sounds without wheeze rales or rhonchi gastrointestinal: Abdomen soft, non-tender, not distended; bowel sounds normal; no masses/organomegaly appreciated  Musculoskeletal: FROM spine and extremities x4; no gross deformity  Neurology: A&O x3; cranial nerves 2-12 grossly intact; motor 5/5  BUE/BLE; no seizure activity;   Psychiatry: Well-groomed with good eye contact; anxious affect; no visual/auditory hallucination  Skin: Warm, dry, normal turgor, no rash  PV: 2/4 radial and dorsalis pedis bilaterally; brisk capillary refill    Labs:  CBC:   Lab Results   Component Value Date/Time    WBC 9.3 02/23/2023 11:27 AM    RBC 4.00 02/23/2023 11:27 AM    HGB 12.3 02/23/2023 11:27 AM    HCT 36.8 02/23/2023 11:27 AM    MCV 92.2 02/23/2023 11:27 AM    MCH 30.9 02/23/2023 11:27 AM    MCHC 33.5 02/23/2023 11:27 AM    RDW 13.6 02/23/2023 11:27 AM     02/23/2023 11:27 AM    MPV 7.2 02/23/2023 11:27 AM     BMP:    Lab Results   Component Value Date/Time     02/23/2023 11:27 AM    K 3.8 02/23/2023 11:27 AM    CL 97 02/23/2023 11:27 AM    CO2 21 02/23/2023 11:27 AM    BUN 20 02/23/2023 11:27 AM    CREATININE 0.6 02/23/2023 11:27 AM    CALCIUM 9.9 02/23/2023 11:27 AM    GFRAA >60 06/02/2016 02:26 PM    LABGLOM >60 02/23/2023 11:27 AM    GLUCOSE 139 02/23/2023 11:27 AM     CT CHEST PULMONARY EMBOLISM W CONTRAST   Final Result   1. No pulmonary embolism. 2. Dependent ground-glass opacities in the lower lobes favored to represent   atelectasis. Pneumonitis and atypical infection are considered less likely. XR CHEST PORTABLE   Final Result   No radiographic evidence of an acute cardiopulmonary process. COPD. EKG: Ventricular Rate 113 P BPM QTc Calculation (Bazett) 482 P ms   Atrial Rate 113 P BPM P Axis 73 P degrees   P-R Interval 172 P ms R Axis -44 P degrees   QRS Duration 86 P ms T Axis 51 P degrees   Q-T Interval 352 P ms Diagnosis Sinus tachycardiaLeft axis deviationSeptal infarct (cited on or before 23-FEB-2023)Abnormal ECGWh.      I visualized CXR images and EKG strips personally and agree with documented interpretation    Discussed case  with ED provider    Problem List:  Principal Problem:    COPD with acute exacerbation ([facility] [address]  Resolved Problems:    * No resolved hospital problems. *        Consults:  None      Assessment/Plan:     Acute dyspnea  -Etiology unclear at this time; possible causes include acute bronchospasm, developing pneumonitis, anxiety/EtOH withdrawal, decompensated CHF  -Continuous pulse oximetry monitoring initiated with PRN supplemental O2   -Encourage aggressive pulmonary toilet including incentive spirometry every 4H while awake  -Xopenex scheduled every 6 hours as needed during stay  -IV Solu-Medrol administered in ED, but will not be continued as no evidence of RAD this time  - Echo scheduled to further assess cardiac structure and function  -Lactate, procalcitonin ordered with results pending to rule out pulmonary infection    Daily alcohol consumption  -Admit to telemetry floor with [facility] Protocol order set initiated  -Urine drug screen ordered to rule out potential cross addiction/polysubstance abuse  -Seizure and fall risk precautions in place  -Banana bag to infuse daily x3 for thiamine, folate, and electrolyte repletion  -Ativan scheduled PRN based on CIWA score    Acute hyperglycemia with BMI 32.26  -A1c ordered to rule out covert DM  -PRN Humalog medium dose SSI scheduled before meals and at bedtime based on POC glucose  -Carbohydrate restriction placed on diet      DVT prophylaxis-Lovenox 40 mg daily  Code status-full code  Diet-cardiac JET with modest carb restriction  IV access-PIV established in ED  Pure wick to be placed to limit toileting efforts    Admit as inpatient. I anticipate hospitalization spanning more than two midnights for investigation and treatment of the above medically necessary diagnoses.       Comment: Please note this report has been produced using speech recognition software and may contain errors related to that system including errors in grammar, punctuation, and spelling, as well as words and phrases that may be inappropriate. If there are any questions or concerns please feel free to contact the dictating provider for clarification.          Radha Bates MD    2/23/2023 7:15 PM

## 2023-02-24 NOTE — ED PROVIDER NOTES
[facility]  ED  EMERGENCY DEPARTMENT ENCOUNTER        Pt Name: Og Gaffney  MRN: [id number]  [place] 1940  Date of evaluation: 2/23/2023  Provider: ALONZO Anthony  PCP: Aranza Machado MD  Note Started: 7:19 PM EST        I have seen and evaluated this patient with my supervising physician Jessica Chou, [facility]       Chief Complaint   Patient presents with    Shortness of Breath     Patient woke up at [place] with trouble taking a deep breath. Patient states she took at home COVID test which was negative and went to urgent care who sent her to the ED. Patient states she cannot sit, she has to get up to try and take a deep breath. HISTORY OF PRESENT ILLNESS      Chief Complaint: Shortness of breath    Og Gaffney is a 78 y.o. female who presents to the emergency room for shortness of breath. Started earlier this morning. Reports difficulty taking a satisfying deep breath. She denies chest pain. No fevers or chills. She has no history of pulmonary embolism. She reports being sick for the past few days. She denies pulmonary or cardiac history. SCREENINGS    Jose Coma Scale  Eye Opening: Spontaneous  Best Verbal Response: Oriented  Best Motor Response: Obeys commands  [place] Coma Scale Score: 15      Is this patient to be included in the SEP-1 Core Measure due to severe sepsis or septic shock? No   Exclusion criteria - the patient is NOT to be included for SEP-1 Core Measure due to:  2+ SIRS criteria are not met      PHYSICAL EXAM     Vitals: BP (!) 175/88   Pulse (!) 114   Temp 98 °F (36.7 °C) (Oral)   Resp 17   Wt 160 lb (72.6 kg)   SpO2 95%   BMI 32.26 kg/m²    General: awake, alert  Pupils: equal, reactive  Head: Non-traumatic  Heart: Rate as noted, regular rhythm, no murmur or rubs.   Chest/Lungs: CTAB, no wheezes or crackles  Abdomen: soft, nondistended, no tenderness to palpation   Extremities:  cap refill <2 UE/LE, no tenderness of calves, no edema  Neuro: no facial droop, no slurred speech, answers questions appropriately. Skin: Warm. No visible rash, lesions, or bruising       DIAGNOSTIC RESULTS   LABS:    Labs Reviewed   COMPREHENSIVE METABOLIC PANEL - Abnormal; Notable for the following components:       Result Value    Sodium 133 (*)     Chloride 97 (*)     Glucose 139 (*)     All other components within normal limits   D-DIMER, QUANTITATIVE - Abnormal; Notable for the following components:    D-Dimer, Quant 1.19 (*)     All other components within normal limits   CBC WITH AUTO DIFFERENTIAL   BRAIN NATRIURETIC PEPTIDE   TROPONIN   TROPONIN       EKG: When ordered, EKG's are interpreted by the Emergency Department Physician in the absence of a cardiologist.  Please see their note for interpretation of EKG. RADIOLOGY:   CT CHEST PULMONARY EMBOLISM W CONTRAST   Final Result   1. No pulmonary embolism. 2. Dependent ground-glass opacities in the lower lobes favored to represent   atelectasis. Pneumonitis and atypical infection are considered less likely. XR CHEST PORTABLE   Final Result   No radiographic evidence of an acute cardiopulmonary process. COPD. XR CHEST PORTABLE    Result Date: 2/23/2023  EXAMINATION: ONE XRAY VIEW OF THE CHEST 2/23/2023 8:29 am COMPARISON: None. HISTORY: ORDERING SYSTEM PROVIDED HISTORY: shortness of breath TECHNOLOGIST PROVIDED HISTORY: Reason for exam:->shortness of breath Reason for Exam: SOB FINDINGS: Lung volumes are large the diaphragm is flattened. There is no confluent airspace consolidation or effusion. The cardiomediastinal silhouette and pulmonary vessels appear normal.     No radiographic evidence of an acute cardiopulmonary process. COPD.      CT CHEST PULMONARY EMBOLISM W CONTRAST    Result Date: 2/23/2023  EXAMINATION: CTA OF THE CHEST 2/23/2023 12:50 pm TECHNIQUE: CTA of the chest was performed after the administration of intravenous contrast. Multiplanar reformatted images are provided for review. MIP images are provided for review. Automated exposure control, iterative reconstruction, and/or weight based adjustment of the mA/kV was utilized to reduce the radiation dose to as low as reasonably achievable. COMPARISON: Radiograph 02/23/2023. HISTORY: ORDERING SYSTEM PROVIDED HISTORY: Short of breath. Dimer elevated. TECHNOLOGIST PROVIDED HISTORY: Reason for exam:->Short of breath. Dimer elevated. Decision Support Exception - unselect if not a suspected or confirmed emergency medical condition->Emergency Medical Condition (MA) Reason for Exam: SOB starting this am, c/o fullness in chest Relevant Medical/Surgical History: no h/o surg to chest, nonsmoker FINDINGS: Pulmonary Arteries: Pulmonary arteries are adequately opacified for evaluation. No evidence of intraluminal filling defect to suggest pulmonary embolism. Main pulmonary artery is normal in caliber. Mediastinum: There are no enlarged lymph nodes. The heart is not enlarged. There is no pericardial effusion. Lungs/pleura: There are dependent ground-glass opacities in the lower lobes. There is a calcified granuloma in the right upper lobe. Mild areas of peripheral linearity likely reflect atelectasis or scarring. The central airways are normally patent. There is no pleural effusion. Upper Abdomen: There is pneumobilia. The upper abdomen is otherwise unremarkable. Soft Tissues/Bones: No acute bone or soft tissue abnormality. 1. No pulmonary embolism. 2. Dependent ground-glass opacities in the lower lobes favored to represent atelectasis. Pneumonitis and atypical infection are considered less likely. No results found. PROCEDURES       CRITICAL CARE TIME   I personally saw the patient and independently provided 15 minutes of non-concurrent critical care time out of the total critical care time provided. This excludes time spent doing separately billable procedures.   This includes time at the bedside, data interpretation, medication management, obtaining critical history from collateral sources if the patient is unable to provide it directly, and physician consultation. Specifics of interventions taken and potentially life-threatening diagnostic considerations are listed above in the medical decision making. CONSULTS   None    ED COURSE and MEDICAL DECISIONS MAKING:   Vitals:    Vitals:    02/23/23 1737 02/23/23 1739 02/23/23 1809 02/23/23 1812   BP: (!) 165/98 (!) 165/98 (!) 175/88 (!) 175/88   Pulse: (!) 119 (!) 116 (!) 114 (!) 114   Resp: 22 14 18 17   Temp:       TempSrc:       SpO2: 96%  97% 95%   Weight:         MEDICATIONS:  Medications   ipratropium-albuterol (DUONEB) nebulizer solution 2 ampule (2 ampules Inhalation Given 2/23/23 1300)   methylPREDNISolone sodium (SOLU-MEDROL) injection 125 mg (125 mg IntraVENous Given 2/23/23 1341)   iopamidol (ISOVUE-370) 76 % injection 75 mL (75 mLs IntraVENous Given 2/23/23 1400)   0.9 % sodium chloride bolus (0 mLs IntraVENous Stopped 2/23/23 1859)   oxymetazoline (AFRIN) 0.05 % nasal spray 2 spray (2 sprays Each Nostril Given 2/23/23 1738)           78-year-old female presents with shortness of breath. Work-up shows a CBC with no leukocytosis or anemia. Chemistry without electrolyte abnormalities, normal renal function, normal kidney function. D-dimer that is elevated, concerning for PE. BNP that is normal range, making heart failure unlikely as a cause of her shortness of breath. Troponin is undetectable x2, making acute cardiac injury and unlikely cause of her shortness of breath. CT PE shows no PE, and atelectasis. Chest x-ray shows concerns for COPD. Patient was treated for COPD exacerbation with breathing treatments, as well as Solu-Medrol. Given 1 L IV normal saline. Patient's lab work-up and imaging are unremarkable. Her vitals are notable for persistent tachycardia, despite fluids and medications.   Will admit to the hospital for persistent tachycardia and this 57-year-old female. FINAL IMPRESSION      1. COPD exacerbation ([address]    2. Tachycardia          DISPOSITION/PLAN   DISPOSITION Admitted 02/23/2023 06:47:28 PM      PATIENT REFERRED TO:  No follow-up provider specified.     DISCHARGE MEDICATIONS:  New Prescriptions    No medications on file       Shabana Rosen (electronically signed)       Shabana Betancourt  02/23/23 [facility]

## 2023-02-24 NOTE — PLAN OF CARE
Problem: Skin/Tissue Integrity  Goal: Absence of new skin breakdown  Description: 1. Monitor for areas of redness and/or skin breakdown  2. Assess vascular access sites hourly  3. Every 4-6 hours minimum:  Change oxygen saturation probe site  4. Every 4-6 hours:  If on nasal continuous positive airway pressure, respiratory therapy assess nares and determine need for appliance change or resting period.   2/24/2023 1007 by Lety Guadalupe RN  Outcome: Progressing     Problem: Safety - Adult  Goal: Free from fall injury  2/24/2023 1007 by Lety Guadalupe RN  Outcome: Progressing

## 2023-02-24 NOTE — PLAN OF CARE
Problem: Discharge Planning  Goal: Discharge to home or other facility with appropriate resources  Outcome: Progressing  Flowsheets (Taken 2/23/2023 2234)  Discharge to home or other facility with appropriate resources:   Identify barriers to discharge with patient and caregiver   Arrange for needed discharge resources and transportation as appropriate   Identify discharge learning needs (meds, wound care, etc)     Problem: ABCDS Injury Assessment  Goal: Absence of physical injury  Outcome: Progressing     Problem: Skin/Tissue Integrity  Goal: Absence of new skin breakdown  Description: 1. Monitor for areas of redness and/or skin breakdown  2. Assess vascular access sites hourly  3. Every 4-6 hours minimum:  Change oxygen saturation probe site  4. Every 4-6 hours:  If on nasal continuous positive airway pressure, respiratory therapy assess nares and determine need for appliance change or resting period.   Outcome: Progressing     Problem: Safety - Adult  Goal: Free from fall injury  Outcome: Progressing

## 2023-02-24 NOTE — PROGRESS NOTES
Hospitalist Progress Note    Date of Admission: 2/23/2023    Chief Complaint: SOB    Hospital Course: Eddei Garcia is a 78 y.o. female who presented to the ED to be evaluated for sudden onset dyspnea which awakened her from sleep at 6 AM this morning. Patient denies chest pain, productive cough, fever/chills. She reports that she took a home COVID test which was negative, after which she made an appointment for [facility]. When she arrived for said appointment, it was recommended that she be transported to the hospital for further evaluation. Patient reports that she has no history of asthma or COPD, nor has she ever smoked tobacco.  There is no previous history of CHF, CAD, or other cardiac issues. Patient does endorse drinking 1-2 cocktails daily for several years. Upon arrival to the ED EKG was obtained and notable for sinus tachycardia with age-indeterminate septal infarct. CT chest negative for pulmonary embolism but did demonstrate groundglass opacification in BLL possibility for atelectasis, pneumonitis, or atypical infection. Notable labs include: Hyponatremia 133, hypochloremia 97, hypomagnesemia 1.4, borderline hyperglycemia 139, and D-dimer elevated 1.19. Patient received high-dose Solu-Medrol 125 mg as well as DuoNeb therapy to treat suspected COPD exacerbation. Patient's respiratory status improved completely, however she was then noted to have sinus tachycardia therefore request for admission placed. Subjective: SOB is very mild but persistent. She is still questioning if she is just anxious. BP noted to be very high. Assessment/Plan:  Acute dyspnea 2/2 Hypertensive Urgency  -Etiology unclear at this time; there is some pneumonitis on CT, but this could be related to her COVID infection last month. Will continue empiric Abx for now. No hx of Asthma/COPD. Anxiety is possible.  However, given her persistently elevated BP, cannot rule out contributing from hypertension.   -Check ECHO  -Monitor on Tele  -Restarted home Losartan/HCTZ but BP remains severely elevated. Will add Amlodipine now and Labetalol PRN. Anticipate she will need continued titration of BP regimen as this may be the culprit of her symptoms. Alcohol use  -Low suspicion for withdrawal syndrome, but can monitor with CIWA for now. Acute hyperglycemia with BMI 32.26  HbA1c 5.2. Hyponatremia: Mild. Monitor. DVT Prophylaxis: Lovenox  Diet: ADULT DIET; Regular; Low Fat/Low Chol/High Fiber/JET  Code Status: Full Code  PT/OT Eval Status: NA    Dispo - Possible DC home 1-2 days depending on symptoms and BP control    Physical Exam Performed:    BP (!) 188/103   Pulse (!) 105   Temp 98.2 °F (36.8 °C) (Oral)   Resp 16   Ht 4' 11\" (1.499 m)   Wt 156 lb 4.8 oz (70.9 kg)   SpO2 98%   BMI 31.57 kg/m²   General appearance:  No apparent distress, appears stated age and cooperative. Respiratory:  Normal respiratory effort. Clear to auscultation, bilaterally without Rales/Wheezes/Rhonchi. Cardiovascular:  Regular rate and rhythm with normal S1/S2 without murmurs, rubs or gallops. Abdomen:  Soft, non-tender, non-distended with normal bowel sounds. Musculoskelatal:  No edema  Neurologic:  Non-focal  Psychiatric:  Alert and oriented, normal insight  Skin:  No rashes or lesions. Capillary Refill:  Brisk, < 3 seconds. Peripheral Pulses:  +2 palpable, equal bilaterally.      Labs:   Recent Labs     02/23/23  1127 02/24/23  0659   WBC 9.3 7.7   HGB 12.3 11.4*   HCT 36.8 35.6*    336     Recent Labs     02/23/23  1127 02/23/23 2021 02/24/23  0659   *  --  134*   K 3.8  --  3.6   CL 97*  --  97*   CO2 21  --  25   BUN 20  --  15   CREATININE 0.6  --  <0.5*   CALCIUM 9.9 9.5 9.3     Recent Labs     02/23/23  1127 02/24/23  0659   AST 23 16   ALT 11 9*   BILITOT 0.7 0.4   ALKPHOS 112 104     Recent Labs     02/23/23 2021   INR 0.99       Consults:    Scot Santiago MD

## 2023-02-24 NOTE — CARE COORDINATION
Case Management Assessment  Initial Evaluation    Date/Time of Evaluation: 2/24/2023 4:06 PM  Assessment Completed by: Leidy Jorge RN    If patient is discharged prior to next notation, then this note serves as note for discharge by case management. Patient Name: Mela Lutz                   YOB: 1940  Diagnosis: Tachycardia [R00.0]  COPD exacerbation ([facility] [address] [J44.1]  COPD with acute exacerbation ([facility] [address] [J44.1]                   Date / Time: 2/23/2023 10:55 AM    Patient Admission Status: Inpatient   Readmission Risk (Low < 19, Mod (19-27), High > 27): Readmission Risk Score: 8.4    Current PCP: Lonnie Nicole MD  PCP verified by CM? Yes    Chart Reviewed: Yes      History Provided by: Patient, Child/Family  Patient Orientation: Alert and Oriented, Person, Place, Situation, Self    Patient Cognition: Alert    Hospitalization in the last 30 days (Readmission):  No    If yes, Readmission Assessment in  Navigator will be completed. Advance Directives:      Code Status: Full Code   Patient's Primary Decision Maker is: Legal Next of Kin    Primary Decision MakeMarcella Sheriff - Child - [phone]    Discharge Planning:    Patient lives with: Alone Type of Home: House  Primary Care Giver: Self  Patient Support Systems include: Children   Current Financial resources: Medicare  Current community resources:    Current services prior to admission: None            Current DME:              Type of Home Care services:  None    ADLS  Prior functional level: Independent in ADLs/IADLs  Current functional level: Independent in ADLs/IADLs    PT AM-PAC:   /24  OT AM-PAC:   /24    Family can provide assistance at DC: Yes  Would you like Case Management to discuss the discharge plan with any other family members/significant others, and if so, who? Yes  Plans to Return to Present Housing: Yes  Potential Assistance needed at discharge:  Other (Comment) (following for needs, watch therapy rec's) Potential DME:    Patient expects to discharge to: House  Plan for transportation at discharge:      Financial    Payor: Nancy Moya / Plan: Jerry Corrales PPO / Product Type: Medicare /     Does insurance require precert for SNF: Yes    Potential assistance Purchasing Medications:    Meds-to-Beds request:        Christine Lutes [phone] [phone] [facility] [phone] [place] [phone]  Rylie Carrillo [address] [place] [address]  Phone: [phone] Fax: [phone]      Notes:    Factors facilitating achievement of predicted outcomes: Family support, Motivated, Cooperative, and Pleasant      Additional Case Management Notes: Patient is independent at home. Denies any DME or services at home. Daughter at bedside and requesting a therapy eval as she states patient has been [facility]" as of recently. She thinks that patient may need a walker at home. Explained that if therapy recommends, CM can get a walker here at hospital through [address] [place] [address] through insurance prior to discharge. They verbalized understanding. Therapy orders placed. CM will continue to follow. IF APPLICABLE: The Patient and/or patient representative Fahad Saucedo and her family were provided with a choice of provider and agrees with the discharge plan. Freedom of choice list with basic dialogue that supports the patient's individualized plan of care/goals and shares the quality data associated with the providers was provided to:     Patient Representative Name:       The Patient and/or Patient Representative Agree with the Discharge Plan?       Fariba Marie RN  Case Management Department  Ph: [phone]

## 2023-02-25 PROCEDURE — 1200000000 HC SEMI PRIVATE

## 2023-02-25 PROCEDURE — 2500000003 HC RX 250 WO HCPCS: Performed by: INTERNAL MEDICINE

## 2023-02-25 PROCEDURE — 2500000003 HC RX 250 WO HCPCS: Performed by: HOSPITALIST

## 2023-02-25 PROCEDURE — 97530 THERAPEUTIC ACTIVITIES: CPT

## 2023-02-25 PROCEDURE — 97165 OT EVAL LOW COMPLEX 30 MIN: CPT

## 2023-02-25 PROCEDURE — 97161 PT EVAL LOW COMPLEX 20 MIN: CPT

## 2023-02-25 PROCEDURE — 97535 SELF CARE MNGMENT TRAINING: CPT

## 2023-02-25 PROCEDURE — 6360000002 HC RX W HCPCS: Performed by: HOSPITALIST

## 2023-02-25 PROCEDURE — 6360000002 HC RX W HCPCS: Performed by: INTERNAL MEDICINE

## 2023-02-25 PROCEDURE — 6370000000 HC RX 637 (ALT 250 FOR IP): Performed by: HOSPITALIST

## 2023-02-25 PROCEDURE — 2580000003 HC RX 258: Performed by: HOSPITALIST

## 2023-02-25 PROCEDURE — 6370000000 HC RX 637 (ALT 250 FOR IP): Performed by: INTERNAL MEDICINE

## 2023-02-25 PROCEDURE — 97116 GAIT TRAINING THERAPY: CPT

## 2023-02-25 RX ORDER — FOLIC ACID 1 MG/1
1 TABLET ORAL DAILY
Status: DISCONTINUED | OUTPATIENT
Start: 2023-02-25 | End: 2023-02-26 | Stop reason: HOSPADM

## 2023-02-25 RX ORDER — LANOLIN ALCOHOL/MO/W.PET/CERES
100 CREAM (GRAM) TOPICAL DAILY
Status: DISCONTINUED | OUTPATIENT
Start: 2023-02-25 | End: 2023-02-26 | Stop reason: HOSPADM

## 2023-02-25 RX ORDER — METOPROLOL SUCCINATE 25 MG/1
25 TABLET, EXTENDED RELEASE ORAL DAILY
Status: DISCONTINUED | OUTPATIENT
Start: 2023-02-25 | End: 2023-02-26 | Stop reason: HOSPADM

## 2023-02-25 RX ORDER — AMLODIPINE BESYLATE 5 MG/1
10 TABLET ORAL DAILY
Status: DISCONTINUED | OUTPATIENT
Start: 2023-02-26 | End: 2023-02-26 | Stop reason: HOSPADM

## 2023-02-25 RX ORDER — PANTOPRAZOLE SODIUM 40 MG/1
40 TABLET, DELAYED RELEASE ORAL
Status: DISCONTINUED | OUTPATIENT
Start: 2023-02-26 | End: 2023-02-26 | Stop reason: HOSPADM

## 2023-02-25 RX ADMIN — ENOXAPARIN SODIUM 40 MG: 100 INJECTION SUBCUTANEOUS at 09:19

## 2023-02-25 RX ADMIN — LORAZEPAM 1 MG: 2 INJECTION INTRAMUSCULAR; INTRAVENOUS at 13:12

## 2023-02-25 RX ADMIN — SODIUM CHLORIDE, PRESERVATIVE FREE 10 ML: 5 INJECTION INTRAVENOUS at 09:19

## 2023-02-25 RX ADMIN — DOXYCYCLINE HYCLATE 100 MG: 100 TABLET, COATED ORAL at 09:19

## 2023-02-25 RX ADMIN — METOPROLOL SUCCINATE 25 MG: 25 TABLET, EXTENDED RELEASE ORAL at 16:44

## 2023-02-25 RX ADMIN — FOLIC ACID: 5 INJECTION, SOLUTION INTRAMUSCULAR; INTRAVENOUS; SUBCUTANEOUS at 12:19

## 2023-02-25 RX ADMIN — LABETALOL HYDROCHLORIDE 10 MG: 5 INJECTION INTRAVENOUS at 23:13

## 2023-02-25 RX ADMIN — FLUTICASONE PROPIONATE 1 SPRAY: 50 SPRAY, METERED NASAL at 09:19

## 2023-02-25 RX ADMIN — HYDROCHLOROTHIAZIDE 12.5 MG: 25 TABLET ORAL at 09:19

## 2023-02-25 RX ADMIN — LOSARTAN POTASSIUM 100 MG: 100 TABLET, FILM COATED ORAL at 09:19

## 2023-02-25 RX ADMIN — SODIUM CHLORIDE, PRESERVATIVE FREE 10 ML: 5 INJECTION INTRAVENOUS at 21:42

## 2023-02-25 RX ADMIN — DOXYCYCLINE HYCLATE 100 MG: 100 TABLET, COATED ORAL at 21:42

## 2023-02-25 RX ADMIN — AMLODIPINE BESYLATE 5 MG: 5 TABLET ORAL at 09:19

## 2023-02-25 RX ADMIN — ONDANSETRON 4 MG: 2 INJECTION INTRAMUSCULAR; INTRAVENOUS at 09:19

## 2023-02-25 NOTE — PROGRESS NOTES
Physical Therapy  Facility/Department: [facility] A2 CARD TELEMETRY  Physical Therapy Initial Assessment/Discharge summary. Name: Eddie Garcia  : 1940  MRN: [id number]  Date of Service: 2023    Discharge Recommendations:      PT Equipment Recommendations  Equipment Needed: Yes  Mobility Devices: Marjo Pique: Rolling  Other: pt would benefit from a RW to improve safety, independence, and mobility while OOB. Patient Diagnosis(es): The primary encounter diagnosis was COPD exacerbation ([address] A diagnosis of Tachycardia was also pertinent to this visit. Past Medical History:  has a past medical history of Arthritis, Hyperlipidemia, and Hypertension. Past Surgical History:  has a past surgical history that includes hernia repair (12/15); Hysterectomy; Small intestine surgery (9/15); Cholecystectomy; Colonoscopy; Breast surgery; Shoulder arthroscopy; skin biopsy; Carpal tunnel release (Left); and joint replacement (Left, 16). Assessment   Assessment: pt seen in conjunction with OT to maximize pt safety and gains from therapy. pt presents to [facility] with primary diagnosis of COPD with acute exacerbation. PTA pt lived at home with 2 BRODIE alone, reports IND with all mobility, transfers, and ambulation. at this time pr demonstrates SUP for bed mobility, SUP for transfers with RW, and SUP for ambulation up to 230'. at this time pt appears to be functioning at her baseline and has no acute PT needs. recommend DC home with assist PRN, and pt would benefit from a RW for safety and to increase independence as she states she only has a cane at home at this time. Treatment Diagnosis: none. Therapy Prognosis: Good  Decision Making: Low Complexity  No Skilled PT:  At baseline function  Requires PT Follow-Up: No  Activity Tolerance  Activity Tolerance: Patient tolerated evaluation without incident     Plan   Physcial Therapy Plan  General Plan: Discharge with evaluation only  Safety Devices  Type of Devices: Call light within reach, Left in bed, Bed alarm in place, Gait belt, Nurse notified  Restraints  Restraints Initially in Place: No     Restrictions  Restrictions/Precautions  Restrictions/Precautions: Fall Risk, General Precautions  Position Activity Restriction  Other position/activity restrictions: Per pt, not supposed to reach her right arm behind her back secondary to shoulder replacement in January. Subjective   Pain: pt denies pain throughout initial evaluation. General  Chart Reviewed: Yes  Patient assessed for rehabilitation services?: Yes  Family / Caregiver Present: No  Referring Practitioner: Sindy Taylor MD  Referral Date : 02/24/23  Diagnosis: COPD exacerbation. Follows Commands: Within Functional Limits  Subjective  Subjective: pt agreeable to PT iniital evaluation. Social/Functional History  Social/Functional History  Lives With: Alone  Type of Home: Condo  Home Layout: One level, Laundry in basement (laubdry in basement with a chair lift.)  Home Access: Stairs to enter without rails  Entrance Stairs - Number of Steps: 2 BRODIE without HR but has grab bar on post.  Bathroom Shower/Tub: Walk-in shower, Shower chair with back  [employer]: Handicap height (handicapped height toilet being installed tuesday.)  [employer]: Grab bars in shower, Shower chair  Home Equipment: Anjana Josephine, Long-handled shoehorn  Has the patient had two or more falls in the past year or any fall with injury in the past year?: Yes (2 falls no injuries, walked into doorway causing falls.)  Receives Help From: Family  ADL Assistance: Needs assistance (pt needs assistance with cooking and cleaning secondary to recent shoulder surgery.)  Ambulation Assistance: Independent  Transfer Assistance: Independent  Active : Yes  Leisure & Hobbies: read, walk the dog, visit with neighbours.   Vision/Hearing  Vision  Vision: Impaired  Vision Exceptions: Wears glasses for reading;Wears glasses for distance;Wears glasses at all times  Hearing  Hearing: Exceptions to [facility]  Hearing Exceptions: Hard of hearing/hearing concerns;Bilateral hearing aid    Cognition   Orientation  Overall Orientation Status: Within Functional Limits  Orientation Level: Oriented to place;Oriented to time;Oriented to situation;Oriented to person  Cognition  Overall Cognitive Status: WFL     Objective   Heart Rate: 96  Heart Rate Source: Monitor  BP: (!) 180/88  BP Location: Left Arm  BP Method: Automatic  Patient Position: Sitting  MAP (Calculated): 119  Resp: 16  SpO2: 97 %  O2 Device: None (Room air)  Comment: HR 93 BPM, SPO2 97% on room air, /77     Observation/Palpation  Posture: Good (with RW.)  Gross Assessment  AROM: Generally decreased, functional  PROM: Generally decreased, functional  Strength: Generally decreased, functional (4-/5 MMT score in all major muscle groups of BLE.)  Sensation: Intact (to light touch.)         Bed Mobility Training  Bed Mobility Training: Yes  Overall Level of Assistance: Supervision  Supine to Sit: Supervision  Sit to Supine: Supervision  Balance  Sitting: Intact  Standing: With support (with RW.)  Transfer Training  Transfer Training: Yes  Overall Level of Assistance: Supervision  Sit to Stand: Supervision  Stand to Sit: Supervision  Gait Training  Gait Training: Yes  Right Side Weight Bearing: As tolerated  Left Side Weight Bearing: As tolerated  Gait  Overall Level of Assistance: Supervision  Interventions: Safety awareness training;Verbal cues  Distance (ft): 230 Feet  Assistive Device: Walker, rolling;Gait belt        AM-PAC Score  AM-PAC Inpatient Mobility Raw Score : 24 (02/25/23 1452)  AM-PAC Inpatient T-Scale Score : 61.14 (02/25/23 1452)  Mobility Inpatient CMS 0-100% Score: 0 (02/25/23 1452)  Mobility Inpatient CMS G-Code Modifier : [address] (02/25/23 1452)        Goals  Short Term Goals  Time Frame for Short Term Goals: 2/25  Short Term Goal 1: pt will demonstrate SUP or better with all mobility, transfers, and ambulation.  -2/25 GOAL MET. Patient Goals   Patient Goals : \"I'd like to go home. \"       Education  Patient Education  Education Given To: Patient  Education Provided: Role of Therapy;Plan of Care;Home Exercise Program;Fall Prevention Strategies;Transfer Training  Education Provided Comments: educated pt on use of RW for safety, mobility, and improving balance. Education Method: Demonstration;Verbal;Teach Back  Barriers to Learning: None  Education Outcome: Verbalized understanding;Demonstrated understanding      Therapy Time   Individual Concurrent Group Co-treatment   Time In 1004         Time Out 1037         Minutes 33         Timed Code Treatment Minutes: 23 Minutes, 10 minutes for initial evaluation. Aarti Novak, PT, DPT  If pt is unable to be seen after this session, please let this note serve as discharge summary. Please see case management note for discharge disposition. Thank you.

## 2023-02-25 NOTE — PLAN OF CARE
Problem: Skin/Tissue Integrity  Goal: Absence of new skin breakdown  Description: 1. Monitor for areas of redness and/or skin breakdown  2. Assess vascular access sites hourly  3. Every 4-6 hours minimum:  Change oxygen saturation probe site  4. Every 4-6 hours:  If on nasal continuous positive airway pressure, respiratory therapy assess nares and determine need for appliance change or resting period.   2/25/2023 1725 by Selby Bence, RN  Outcome: Progressing     Problem: Safety - Adult  Goal: Free from fall injury  2/25/2023 1725 by Selby Bence, RN  Outcome: Progressing     Problem: Discharge Planning  Goal: Discharge to home or other facility with appropriate resources  2/25/2023 1725 by Selby Bence, RN  Outcome: Progressing

## 2023-02-25 NOTE — PLAN OF CARE
Problem: Discharge Planning  Goal: Discharge to home or other facility with appropriate resources  Outcome: Progressing   Pt still having anxiety, BP still running high. Pt does not yet have discharge order. Problem: ABCDS Injury Assessment  Goal: Absence of physical injury  Outcome: Progressing     Problem: Skin/Tissue Integrity  Goal: Absence of new skin breakdown  Description: 1. Monitor for areas of redness and/or skin breakdown  2. Assess vascular access sites hourly  3. Every 4-6 hours minimum:  Change oxygen saturation probe site  4. Every 4-6 hours:  If on nasal continuous positive airway pressure, respiratory therapy assess nares and determine need for appliance change or resting period. Outcome: Progressing   Pt is at risk for skin breakdown. Pt will have skin assessments every shift, Q2 turns, heels elevated off of the bed, and friction and shear prevented when possible. Will continue to monitor for signs of skin breakdown and enforce prevention measures. Problem: Safety - Adult  Goal: Free from fall injury  Outcome: Progressing   Pt high fall risk. Instructed to use call light before getting out of bed. Call light within reach. Bed in low position. Bed alarm on. Will continue to monitor.

## 2023-02-25 NOTE — PROGRESS NOTES
Hospitalist Progress Note      PCP: JAVI JOLLY MD    Date of Admission: 2/23/2023    Chief Complaint: Shortness of breath    Hospital Course:  82 y.o. female who presented to the ED to be evaluated for sudden onset dyspnea which awakened her from sleep at 6 AM this morning.  Patient denies chest pain, productive cough, fever/chills.  She reports that she took a home COVID test which was negative, after which she made an appointment for [facility].  When she arrived for said appointment, it was recommended that she be transported to the hospital for further evaluation.  Patient reports that she has no history of asthma or COPD, nor has she ever smoked tobacco.  There is no previous history of CHF, CAD, or other cardiac issues.  Patient does endorse drinking 1-2 cocktails daily for several years.     Upon arrival to the ED EKG was obtained and notable for sinus tachycardia with age-indeterminate septal infarct.  CT chest negative for pulmonary embolism but did demonstrate groundglass opacification in BLL possibility for atelectasis, pneumonitis, or atypical infection.  Notable labs include: Hyponatremia 133, hypochloremia 97, hypomagnesemia 1.4, borderline hyperglycemia 139, and D-dimer elevated 1.19.  Patient received high-dose Solu-Medrol 125 mg as well as DuoNeb therapy to treat suspected COPD exacerbation.  Patient's respiratory status improved completely, however she was then noted to have sinus tachycardia therefore request for admission placed.    Subjective: Patient is lying in the bed currently on room air denies any chest pain or shortness of breath that she lives home alone      Medications:  Reviewed    Infusion Medications    sodium chloride      sodium chloride       Scheduled Medications    losartan  100 mg Oral Daily    hydroCHLOROthiazide  12.5 mg Oral Daily    amLODIPine  5 mg Oral Daily    sodium chloride flush  10 mL IntraVENous 2 times per day    enoxaparin  40 mg SubCUTAneous Daily     doxycycline hyclate  100 mg Oral T31R    folic acid, thiamine, multi-vitamin with vitamin K infusion   IntraVENous Daily    fluticasone  1 spray Each Nostril Daily     PRN Meds: ipratropium, perflutren lipid microspheres, ondansetron, labetalol, sodium chloride flush, sodium chloride, senna, polyethylene glycol, acetaminophen **OR** acetaminophen, sodium chloride, LORazepam **OR** LORazepam **OR** LORazepam **OR** LORazepam **OR** LORazepam **OR** LORazepam **OR** LORazepam **OR** LORazepam, levalbuterol      Intake/Output Summary (Last 24 hours) at 2/25/2023 0941  Last data filed at 2/25/2023 0806  Gross per 24 hour   Intake 1450 ml   Output 0 ml   Net 1450 ml       Physical Exam Performed:    BP (!) 193/82   Pulse 91   Temp 98.4 °F (36.9 °C) (Oral)   Resp 16   Ht 4' 11\" (1.499 m)   Wt 154 lb 9.6 oz (70.1 kg)   SpO2 96%   BMI 31.23 kg/m²     General appearance: No apparent distress, appears stated age and cooperative. HEENT: Pupils equal, round, and reactive to light. Conjunctivae/corneas clear. Neck: Supple, with full range of motion. No jugular venous distention. Trachea midline. Respiratory:  Normal respiratory effort. Clear to auscultation, bilaterally without Rales/Wheezes/Rhonchi. Decreased breath sounds. Cardiovascular: Regular rate and rhythm with normal S1/S2 without murmurs, rubs or gallops. Abdomen: Soft, non-tender, non-distended with normal bowel sounds. Musculoskeletal: No clubbing, cyanosis or edema bilaterally. Full range of motion without deformity. Skin: Skin color, texture, turgor normal.  No rashes or lesions. Neurologic:  Neurovascularly intact without any focal sensory/motor deficits.  Cranial nerves: II-XII intact, grossly non-focal.  Psychiatric: Alert and oriented, thought content appropriate, normal insight  Capillary Refill: Brisk, 3 seconds, normal   Peripheral Pulses: +2 palpable, equal bilaterally       Labs:   Recent Labs     02/23/23  1127 02/24/23  0659   WBC 9.3 7.7 HGB 12.3 11.4*   HCT 36.8 35.6*    336     Recent Labs     02/23/23  1127 02/23/23 2021 02/24/23  0659   *  --  134*   K 3.8  --  3.6   CL 97*  --  97*   CO2 21  --  25   BUN 20  --  15   CREATININE 0.6  --  <0.5*   CALCIUM 9.9 9.5 9.3     Recent Labs     02/23/23  1127 02/24/23  0659   AST 23 16   ALT 11 9*   BILITOT 0.7 0.4   ALKPHOS 112 104     Recent Labs     02/23/23 2021   INR 0.99     Recent Labs     02/23/23  1127 02/23/23  1549   TROPONINI <0.01 <0.01       Urinalysis:      Lab Results   Component Value Date/Time    NITRU Negative 06/02/2016 02:26 PM    BLOODU Negative 06/02/2016 02:26 PM    SPECGRAV 1.020 06/02/2016 02:26 PM    GLUCOSEU Negative 06/02/2016 02:26 PM       Radiology:  CT CHEST PULMONARY EMBOLISM W CONTRAST   Final Result   1. No pulmonary embolism. 2. Dependent ground-glass opacities in the lower lobes favored to represent   atelectasis. Pneumonitis and atypical infection are considered less likely. XR CHEST PORTABLE   Final Result   No radiographic evidence of an acute cardiopulmonary process. COPD. None    Assessment/Plan:    Active Hospital Problems    Diagnosis     COPD with acute exacerbation ([facility] [address] [J44.1]      Priority: Medium     This 78-year-old female admitted with dyspnea secondary hypertensive urgency currently on room air CT of the chest some pneumonitis on IV antibiotic, procalcitonin level negative will discontinue IV antibiotic COVID infection last month check procalcitonin level, 2D echo today with a EF of 55 to 60% no regional wall motion abnormalities seen normal left ventricular size with moderate concentric left ventricular hypertrophy  Hypertension continue with the losartan/hydrochlorothiazide on labetalol as needed. We will add beta-blocker  Alcohol abuse monitor with the CIWA protocol continue with thiamine folic acid.   Acute hyperglycemia in the emergency room hemoglobin A1c 5.2 on 2/23/2023    DVT Prophylaxis: Lovenox subcu  Diet: ADULT DIET;  Regular; Low Fat/Low Chol/High Fiber/JET  Code Status: Full Code  PT/OT Eval Status:     Dispo -     Appropriate for A1 Discharge Unit: Nalini Farrell MD

## 2023-02-25 NOTE — PROGRESS NOTES
Occupational Therapy  Facility/Department: [facility] A2 CARD TELEMETRY  Occupational Therapy Initial Assessment/Treatment     Name: Delmy Soliman  : 1940  MRN: [id number]  Date of Service: 2023    Discharge Recommendations:  Home with assist PRN  OT Equipment Recommendations  Equipment Needed: No       Patient Diagnosis(es): The primary encounter diagnosis was COPD exacerbation ([address] A diagnosis of Tachycardia was also pertinent to this visit. Past Medical History:  has a past medical history of Arthritis, Hyperlipidemia, and Hypertension. Past Surgical History:  has a past surgical history that includes hernia repair (12/15); Hysterectomy; Small intestine surgery (9/15); Cholecystectomy; Colonoscopy; Breast surgery; Shoulder arthroscopy; skin biopsy; Carpal tunnel release (Left); and joint replacement (Left, 16). Assessment   Assessment: Pt 79 yo female functioning with deficits in the areas listed above following SOB. Pt reports IND PLOF and lives at home alone. Pt required S-ind level for all adls and mobility. Pt demo'd no LOB and able to complete standing level adls. No further OT needs at this time. Please re-order if there is a change in status. Prognosis: Good  Decision Making: Low Complexity  No Skilled OT: At baseline function; Safe to return home  REQUIRES OT FOLLOW-UP: No  Activity Tolerance  Activity Tolerance: Patient Tolerated treatment well  Activity Tolerance Comments: 169/77 o2 99 HR 93        Plan   Occupational Therapy Plan  Times Per Week: 1x only     Restrictions  Restrictions/Precautions  Restrictions/Precautions: Fall Risk  Position Activity Restriction  Other position/activity restrictions: Per pt, not supposed to reach her right arm behind her back    Subjective   General  Chart Reviewed: Yes  Patient assessed for rehabilitation services?: Yes  Additional Pertinent Hx: 23 R total shoulder replacement  Family / Caregiver Present: Yes  Referring Practitioner: Philipp Kapoor MD  Diagnosis: SOB  Subjective  Subjective: Pt pleasant and agreeable to therapy  General Comment  Comments: RN approved therapy     Social/Functional History  Social/Functional History  Lives With: Alone  Type of Home: Condo  Home Layout: One level, Laundry in basement (laubdry in basement with a chair lift.)  Home Access: Stairs to enter without rails  Entrance Stairs - Number of Steps: 2 BRODIE without HR but has grab bar on post.  Bathroom Shower/Tub: Walk-in shower, Shower chair with back  [employer]: Handicap height (handicapped height toilet being installed tuesday.)  [employer]: Grab bars in shower, Shower chair  Home Equipment: Estrellita Ayer, Long-handled shoehorn  Has the patient had two or more falls in the past year or any fall with injury in the past year?: Yes (2 falls no injuries, walked into doorway causing falls.)  Receives Help From: Family  ADL Assistance: Needs assistance (pt needs assistance with cooking and cleaning secondary to recent shoulder surgery.)  Ambulation Assistance: Independent  Transfer Assistance: Independent  Active : Yes  Leisure & Hobbies: read, walk the dog, visit with neighbours. Objective   Heart Rate: 96  Heart Rate Source: Monitor  BP: (!) 169/77  BP Location: Left Arm  BP Method: Automatic  Patient Position: Sitting  MAP (Calculated): 108  Resp: 16  SpO2: 97 %  O2 Device: None (Room air)  Comment: HR 93 BPM, SPO2 97% on room air, /77          Observation/Palpation  Posture: Good  Safety Devices  Type of Devices: Call light within reach; Left in bed;Bed alarm in place;Gait belt;Nurse notified  Bed Mobility Training  Bed Mobility Training: Yes  Overall Level of Assistance: Supervision  Supine to Sit: Supervision  Sit to Supine: Supervision  Transfer Training  Transfer Training: Yes  Overall Level of Assistance: Supervision  Sit to Stand: Independent  Stand to Sit: Independent     AROM: Within functional limits  Strength:  Within functional limits  Coordination: Within functional limits  ADL  Grooming: Independent  LE Dressing: Independent  Toileting: Independent        Bed mobility  Supine to Sit: Independent  Sit to Supine: Independent  Transfers  Sit to stand: Independent  Stand to sit: Independent  Vision  Vision: Impaired  Vision Exceptions: Wears glasses for reading;Wears glasses for distance;Wears glasses at all times  Hearing  Hearing: Exceptions to [facility]  Hearing Exceptions: Hard of hearing/hearing concerns;Bilateral hearing aid  Cognition  Overall Cognitive Status: WFL  Orientation  Overall Orientation Status: Within Functional Limits  Orientation Level: Oriented to place;Oriented to time;Oriented to situation;Oriented to person                  Education Given To: Patient  Education Provided: Role of Therapy;Home Exercise Program;Precautions; Energy Conservation;Transfer Training  Education Method: Demonstration  Education Outcome: Verbalized understanding  LUE AROM (degrees)  LUE AROM : WFL  RUE AROM (degrees)  RUE AROM : WFL  RUE General AROM: recent R total replacement so limited ROM                         Goals  Short Term Goals  Time Frame for Short Term Goals: 1x only  Short Term Goal 1: PT will complete light adls with S. goal met  Short Term Goal 2: . Short Term Goal 3: .  Patient Goals   Patient goals : \"to get better\"       Therapy Time   Individual Concurrent Group Co-treatment   Time In 1003         Time Out 1040         Minutes 37         Timed Code Treatment Minutes: 27 Minutes (10 minutes for evaluation)       Cari Robertson OTR/L    If pt is unable to be seen after this session, please let this note serve as discharge summary. Please see case management note for discharge disposition. Thank you.

## 2023-02-25 NOTE — PROGRESS NOTES
Pt standing at side of bed upon entering room. Daughter at bedside helping to soothe patient. Patient feeling very anxious. BP elevated. Medicated pt with ordered Norvasc. Pt scored 8 on CIWA, gave 1mg PO Ativan.

## 2023-02-25 NOTE — PROGRESS NOTES
Physician Progress Note      Lukas Harris  CSN #:                  [id number]  :                       1940  ADMIT DATE:       2023 10:55 AM  DISCH DATE:  Rick Engle  PROVIDER #:        Paulino Hammer MD          QUERY TEXT:    Pt admitted with SOB. Noted documentation of hyponatremia by ED consultant   and given 1 L NS. Sodium 133 rising to 134. If possible, please document in   progress notes and discharge summary:    The medical record reflects the following:  Risk Factors: COPD  Clinical Indicators: Hyponatremia per ED provider, Na+ 133->134  Treatment: Labs, 1 L NS followed by 75 ml/hr cont. infusion, supportive care    thank you-Maria T Bailey RN BSN  Options provided:  -- Hyponatremia confirmed  -- Hyponatremia ruled out  -- Other - I will add my own diagnosis  -- Disagree - Not applicable / Not valid  -- Disagree - Clinically unable to determine / Unknown  -- Refer to Clinical Documentation Reviewer    PROVIDER RESPONSE TEXT:    The diagnosis of hyponatremia was confirmed. Query created by: Taye Zaragoza on 2023 12:17 PM      QUERY TEXT:    Patient admitted with SOB, noted to have atelectasis in bilateral LL on CT   scan. If possible, please document in progress notes and discharge summary if   you are evaluating and/or treating any of the following: The medical record reflects the following:  Risk Factors: COPD  Clinical Indicators: per ED provider- \"Reports difficulty taking a satisfying   deep breath. \" CT -Dependent ground-glass opacities in the lower lobes favored   to represent atelectasis.   Treatment: CT, Solumedrol, Duonebs, pulmonary expansion maneuvers, supportive   care    thank you-Maria T Bailey RN BSN  Options provided:  -- Atelectasis  -- CT opacities not clinically significant  -- Other - I will add my own diagnosis  -- Disagree - Not applicable / Not valid  -- Disagree - Clinically unable to determine / Unknown  -- Refer to Clinical Documentation Reviewer    PROVIDER RESPONSE TEXT:    Acute Dyspnea 2/2 Hypertensive Urgency    Query created by: Beverly Quinones on 2/24/2023 12:21 PM      Electronically signed by:  Enrique Faust MD 2/24/2023 8:08 PM

## 2023-02-26 VITALS
TEMPERATURE: 97.8 F | SYSTOLIC BLOOD PRESSURE: 162 MMHG | RESPIRATION RATE: 16 BRPM | HEART RATE: 92 BPM | OXYGEN SATURATION: 99 % | WEIGHT: 154.6 LBS | DIASTOLIC BLOOD PRESSURE: 89 MMHG | HEIGHT: 59 IN | BODY MASS INDEX: 31.17 KG/M2

## 2023-02-26 LAB — MAGNESIUM: 1.6 MG/DL (ref 1.8–2.4)

## 2023-02-26 PROCEDURE — 6370000000 HC RX 637 (ALT 250 FOR IP): Performed by: INTERNAL MEDICINE

## 2023-02-26 PROCEDURE — 6360000002 HC RX W HCPCS: Performed by: HOSPITALIST

## 2023-02-26 PROCEDURE — 6370000000 HC RX 637 (ALT 250 FOR IP): Performed by: HOSPITALIST

## 2023-02-26 PROCEDURE — 83735 ASSAY OF MAGNESIUM: CPT

## 2023-02-26 PROCEDURE — 36415 COLL VENOUS BLD VENIPUNCTURE: CPT

## 2023-02-26 PROCEDURE — 2580000003 HC RX 258: Performed by: HOSPITALIST

## 2023-02-26 RX ORDER — AMLODIPINE BESYLATE 10 MG/1
10 TABLET ORAL DAILY
Qty: 30 TABLET | Refills: 3 | Status: SHIPPED | OUTPATIENT
Start: 2023-02-27

## 2023-02-26 RX ORDER — METOPROLOL SUCCINATE 25 MG/1
25 TABLET, EXTENDED RELEASE ORAL DAILY
Qty: 30 TABLET | Refills: 3 | Status: SHIPPED | OUTPATIENT
Start: 2023-02-27

## 2023-02-26 RX ORDER — FOLIC ACID 1 MG/1
1 TABLET ORAL DAILY
Qty: 30 TABLET | Refills: 3 | Status: SHIPPED | OUTPATIENT
Start: 2023-02-27

## 2023-02-26 RX ORDER — MAGNESIUM SULFATE 1 G/100ML
1000 INJECTION INTRAVENOUS ONCE
Status: COMPLETED | OUTPATIENT
Start: 2023-02-26 | End: 2023-02-26

## 2023-02-26 RX ORDER — LANOLIN ALCOHOL/MO/W.PET/CERES
100 CREAM (GRAM) TOPICAL DAILY
Qty: 30 TABLET | Refills: 3 | Status: SHIPPED | OUTPATIENT
Start: 2023-02-27

## 2023-02-26 RX ORDER — PANTOPRAZOLE SODIUM 40 MG/1
40 TABLET, DELAYED RELEASE ORAL
Qty: 30 TABLET | Refills: 3 | Status: SHIPPED | OUTPATIENT
Start: 2023-02-27

## 2023-02-26 RX ADMIN — METOPROLOL SUCCINATE 25 MG: 25 TABLET, EXTENDED RELEASE ORAL at 08:43

## 2023-02-26 RX ADMIN — DOXYCYCLINE HYCLATE 100 MG: 100 TABLET, COATED ORAL at 08:43

## 2023-02-26 RX ADMIN — SODIUM CHLORIDE, PRESERVATIVE FREE 10 ML: 5 INJECTION INTRAVENOUS at 08:44

## 2023-02-26 RX ADMIN — MAGNESIUM SULFATE HEPTAHYDRATE 1000 MG: 1 INJECTION, SOLUTION INTRAVENOUS at 14:34

## 2023-02-26 RX ADMIN — Medication 100 MG: at 08:43

## 2023-02-26 RX ADMIN — ENOXAPARIN SODIUM 40 MG: 100 INJECTION SUBCUTANEOUS at 08:45

## 2023-02-26 RX ADMIN — AMLODIPINE BESYLATE 10 MG: 5 TABLET ORAL at 08:44

## 2023-02-26 RX ADMIN — LORAZEPAM 1 MG: 2 INJECTION INTRAMUSCULAR; INTRAVENOUS at 08:57

## 2023-02-26 RX ADMIN — FOLIC ACID 1 MG: 1 TABLET ORAL at 08:43

## 2023-02-26 RX ADMIN — PANTOPRAZOLE SODIUM 40 MG: 40 TABLET, DELAYED RELEASE ORAL at 06:15

## 2023-02-26 RX ADMIN — HYDROCHLOROTHIAZIDE 12.5 MG: 25 TABLET ORAL at 08:43

## 2023-02-26 RX ADMIN — LORAZEPAM 1 MG: 2 INJECTION INTRAMUSCULAR; INTRAVENOUS at 02:02

## 2023-02-26 RX ADMIN — LOSARTAN POTASSIUM 100 MG: 100 TABLET, FILM COATED ORAL at 08:43

## 2023-02-26 RX ADMIN — FLUTICASONE PROPIONATE 1 SPRAY: 50 SPRAY, METERED NASAL at 08:45

## 2023-02-26 NOTE — PLAN OF CARE
Problem: Safety - Adult  Goal: Free from fall injury  2/26/2023 0211 by Aleshia Tidwell RN  Flowsheets (Taken 2/26/2023 0211)  Free From Fall Injury: Instruct family/caregiver on patient safety  Note: Pt awoke with increasing anxiety/ SOB. Pt % on RA. Pt assisted to ambulate in room, use the restroom, have a drink of water. Pt CiWA score increased. Pt given PRN ativan as ordered. Vitals stable, pt rtuned to bed, eyes closed, bed alarm engaged, call light in reach.    2/25/2023 1725 by Yoseph Haney RN  Outcome: Progressing

## 2023-02-26 NOTE — CARE COORDINATION
CASE MANAGEMENT DISCHARGE SUMMARY      Discharge to: home with family     New Durable Medical Equipment ordered/agency: RW delivered to room through [employer]:    Family/car: private

## 2023-02-26 NOTE — PROGRESS NOTES
Pt d/c'd home. Removed  IV and stopped bleeding. Catheter intact. Pt tolerated well. No redness noted at site. Notified [facility] and removed tele box. Reviewed d/c instructions, home meds, and  f/u information utilizing teach-back method. Scripts for  given to patient. Patient verbalized understanding. Patient wheeled out to front entrance with all belongings.

## 2023-02-26 NOTE — PLAN OF CARE
Problem: ABCDS Injury Assessment  Goal: Absence of physical injury  Outcome: Progressing     Problem: Skin/Tissue Integrity  Goal: Absence of new skin breakdown  Description: 1. Monitor for areas of redness and/or skin breakdown  2. Assess vascular access sites hourly  3. Every 4-6 hours minimum:  Change oxygen saturation probe site  4. Every 4-6 hours:  If on nasal continuous positive airway pressure, respiratory therapy assess nares and determine need for appliance change or resting period.   Outcome: Progressing     Problem: Safety - Adult  Goal: Free from fall injury  2/26/2023 0939 by Vito Osgood, RN  Outcome: Progressing

## 2023-02-26 NOTE — PROGRESS NOTES
Hospitalist Progress Note      PCP: Alessandro Hilton MD    Date of Admission: 2/23/2023    Chief Complaint: Shortness of breath       Hospital Course:  78 y.o. female who presented to the ED to be evaluated for sudden onset dyspnea which awakened her from sleep at 6 AM this morning. Patient denies chest pain, productive cough, fever/chills. She reports that she took a home COVID test which was negative, after which she made an appointment for [facility]. When she arrived for said appointment, it was recommended that she be transported to the hospital for further evaluation. Patient reports that she has no history of asthma or COPD, nor has she ever smoked tobacco.  There is no previous history of CHF, CAD, or other cardiac issues. Patient does endorse drinking 1-2 cocktails daily for several years. Upon arrival to the ED EKG was obtained and notable for sinus tachycardia with age-indeterminate septal infarct. CT chest negative for pulmonary embolism but did demonstrate groundglass opacification in BLL possibility for atelectasis, pneumonitis, or atypical infection. Notable labs include: Hyponatremia 133, hypochloremia 97, hypomagnesemia 1.4, borderline hyperglycemia 139, and D-dimer elevated 1.19. Patient received high-dose Solu-Medrol 125 mg as well as DuoNeb therapy to treat suspected COPD exacerbation. Patient's respiratory status improved completely, however she was then noted to have sinus tachycardia therefore request for admission placed. Subjective: Patient is lying in the bed denies any chest pain or shortness of breath no nausea vomiting lives home alone she has a daughter who is a RN working hemodialysis unit at [employer] for 30 years. Patient stated that history of anxiety and depression she used to take lorazepam previously would like to have a prescription of it complains of some anxiety and depression denies any suicidal homicidal ideation or thoughts.   She drinks 1-2 bourbon drink per day.      Medications:  Reviewed    Infusion Medications    sodium chloride      sodium chloride       Scheduled Medications    amLODIPine  10 mg Oral Daily    metoprolol succinate  25 mg Oral Daily    pantoprazole  40 mg Oral QAM AC    thiamine  100 mg Oral Daily    folic acid  1 mg Oral Daily    losartan  100 mg Oral Daily    hydroCHLOROthiazide  12.5 mg Oral Daily    sodium chloride flush  10 mL IntraVENous 2 times per day    enoxaparin  40 mg SubCUTAneous Daily    doxycycline hyclate  100 mg Oral Q12H    fluticasone  1 spray Each Nostril Daily     PRN Meds: ipratropium, perflutren lipid microspheres, ondansetron, labetalol, sodium chloride flush, sodium chloride, senna, polyethylene glycol, acetaminophen **OR** acetaminophen, sodium chloride, LORazepam **OR** LORazepam **OR** LORazepam **OR** LORazepam **OR** LORazepam **OR** LORazepam **OR** LORazepam **OR** LORazepam, levalbuterol      Intake/Output Summary (Last 24 hours) at 2/26/2023 0800  Last data filed at 2/26/2023 9157  Gross per 24 hour   Intake 240 ml   Output 0 ml   Net 240 ml       Physical Exam Performed:    BP (!) 194/91   Pulse 84   Temp 97.2 °F (36.2 °C) (Axillary)   Resp 16   Ht 4' 11\" (1.499 m)   Wt 154 lb 9.6 oz (70.1 kg)   SpO2 96%   BMI 31.23 kg/m²     General appearance: No apparent distress, appears stated age and cooperative. HEENT: Pupils equal, round, and reactive to light. Conjunctivae/corneas clear. Neck: Supple, with full range of motion. No jugular venous distention. Trachea midline. Respiratory:  Normal respiratory effort. Clear to auscultation, bilaterally without Rales/Wheezes/Rhonchi. Cardiovascular: Regular rate and rhythm with normal S1/S2 without murmurs, rubs or gallops. Abdomen: Soft, non-tender, non-distended with normal bowel sounds. Musculoskeletal: No clubbing, cyanosis or edema bilaterally. Full range of motion without deformity.   Skin: Skin color, texture, turgor normal.  No rashes or lesions. Neurologic:  Neurovascularly intact without any focal sensory/motor deficits. Cranial nerves: II-XII intact, grossly non-focal.  Psychiatric: Alert and oriented, thought content appropriate, normal insight  Capillary Refill: Brisk, 3 seconds, normal   Peripheral Pulses: +2 palpable, equal bilaterally       Labs:   Recent Labs     02/23/23 1127 02/24/23  0659   WBC 9.3 7.7   HGB 12.3 11.4*   HCT 36.8 35.6*    336     Recent Labs     02/23/23 1127 02/23/23 2021 02/24/23  0659   *  --  134*   K 3.8  --  3.6   CL 97*  --  97*   CO2 21  --  25   BUN 20  --  15   CREATININE 0.6  --  <0.5*   CALCIUM 9.9 9.5 9.3     Recent Labs     02/23/23 1127 02/24/23  0659   AST 23 16   ALT 11 9*   BILITOT 0.7 0.4   ALKPHOS 112 104     Recent Labs     02/23/23 2021   INR 0.99     Recent Labs     02/23/23 1127 02/23/23  1549   TROPONINI <0.01 <0.01       Urinalysis:      Lab Results   Component Value Date/Time    NITRU Negative 06/02/2016 02:26 PM    BLOODU Negative 06/02/2016 02:26 PM    SPECGRAV 1.020 06/02/2016 02:26 PM    GLUCOSEU Negative 06/02/2016 02:26 PM       Radiology:  CT CHEST PULMONARY EMBOLISM W CONTRAST   Final Result   1. No pulmonary embolism. 2. Dependent ground-glass opacities in the lower lobes favored to represent   atelectasis. Pneumonitis and atypical infection are considered less likely. XR CHEST PORTABLE   Final Result   No radiographic evidence of an acute cardiopulmonary process. COPD.              None    Assessment/Plan:    Active Hospital Problems    Diagnosis     COPD with acute exacerbation ([facility] [address] [J44.1]      Priority: Medium     This 70-year-old female admitted with dyspnea secondary hypertensive urgency currently on room air CT of the chest some pneumonitis on IV antibiotic, procalcitonin level negative will discontinue IV antibiotic COVID infection last month, 2D echo today with a EF of 55 to 60% no regional wall motion abnormalities seen normal left ventricular size with moderate concentric left ventricular hypertrophy. Hypertension continue with the losartan/hydrochlorothiazide added Norvasc 10 mg p.o. daily, Toprol XL 25 mg p.o. daily recommended patient to monitor blood pressure closely at home and follow-up with the PCP for blood pressure check and further adjustment antihypertensive medications. Alcohol abuse monitor with the CIWA protocol continue with thiamine folic acid. Acute hyperglycemia in the emergency room hemoglobin A1c 5.2 on 2/23/2023  History of recent right shoulder surgery at [facility] on 1/12/2023 patient recently had a postop follow-up with orthopedic recommended to continue with the physical Occupational Therapy as outpatient. Called and talked to the patient's daughter Katie Askew [phone] discussed with the plan of care will discharge patient home today to follow-up with the PCP within a week history of anxiety and depression patient to follow-up with the PCP to starting on SSRI would not recommend benzodiazepine discussed with the patient and daughter. DVT Prophylaxis: Lovenox subcu  Diet: ADULT DIET;  Regular; Low Fat/Low Chol/High Fiber/JET  Code Status: Full Code  PT/OT Eval Status: Consulted    Dispo -     Appropriate for A1 Discharge Unit: No      Maria R Eason MD

## 2023-03-05 NOTE — DISCHARGE SUMMARY
Hospital Medicine Discharge Summary    Patient ID: Adithya Maldonado      Patient's PCP: Quincy Alicia MD    Admit Date: 2/23/2023     Discharge Date: 2/26/2023      Admitting Provider: Grecia Currie MD     Discharge Provider: Sandie Garza MD     Discharge Diagnoses: Active Hospital Problems    Diagnosis     COPD with acute exacerbation ([facility] [address] [J44.1]      Priority: Medium       The patient was seen and examined on day of discharge and this discharge summary is in conjunction with any daily progress note from day of discharge. Hospital Course: This 78-year-old female admitted with dyspnea secondary hypertensive urgency currently on room air CT of the chest some pneumonitis on IV antibiotic, procalcitonin level negative will discontinue IV antibiotic COVID infection last month, 2D echo today with a EF of 55 to 60% no regional wall motion abnormalities seen normal left ventricular size with moderate concentric left ventricular hypertrophy. Hypertension continue with the losartan/hydrochlorothiazide added Norvasc 10 mg p.o. daily, Toprol XL 25 mg p.o. daily recommended patient to monitor blood pressure closely at home and follow-up with the PCP for blood pressure check and further adjustment antihypertensive medications. Alcohol abuse monitor with the CIWA protocol continue with thiamine folic acid. Acute hyperglycemia in the emergency room hemoglobin A1c 5.2 on 2/23/2023  History of recent right shoulder surgery at [facility] on 1/12/2023 patient recently had a postop follow-up with orthopedic recommended to continue with the physical Occupational Therapy as outpatient.   Called and talked to the patient's daughter Chantal Castrejon [phone] discussed with the plan of care will discharge patient home today to follow-up with the PCP within a week history of anxiety and depression patient to follow-up with the PCP to starting on SSRI would not recommend benzodiazepine discussed with the patient and daughter. Physical Exam Performed:     BP (!) 162/89   Pulse 92   Temp 97.8 °F (36.6 °C) (Oral)   Resp 16   Ht 4' 11\" (1.499 m)   Wt 154 lb 9.6 oz (70.1 kg)   SpO2 99%   BMI 31.23 kg/m²       General appearance:  No apparent distress, appears stated age and cooperative. HEENT:  Normal cephalic, atraumatic without obvious deformity. Pupils equal, round, and reactive to light. Extra ocular muscles intact. Conjunctivae/corneas clear. Neck: Supple, with full range of motion. No jugular venous distention. Trachea midline. Respiratory:  Normal respiratory effort. Clear to auscultation, bilaterally without Rales/Wheezes/Rhonchi. Cardiovascular:  Regular rate and rhythm with normal S1/S2 without murmurs, rubs or gallops. Abdomen: Soft, non-tender, non-distended with normal bowel sounds. Musculoskeletal:  No clubbing, cyanosis or edema bilaterally. Full range of motion without deformity. Skin: Skin color, texture, turgor normal.  No rashes or lesions. Neurologic:  Neurovascularly intact without any focal sensory/motor deficits. Cranial nerves: II-XII intact, grossly non-focal.  Psychiatric:  Alert and oriented, thought content appropriate, normal insight  Capillary Refill: Brisk,< 3 seconds   Peripheral Pulses: +2 palpable, equal bilaterally       Labs:  For convenience and continuity at follow-up the following most recent labs are provided:      CBC:    Lab Results   Component Value Date/Time    WBC 7.7 02/24/2023 06:59 AM    HGB 11.4 02/24/2023 06:59 AM    HCT 35.6 02/24/2023 06:59 AM     02/24/2023 06:59 AM       Renal:    Lab Results   Component Value Date/Time     02/24/2023 06:59 AM    K 3.6 02/24/2023 06:59 AM    CL 97 02/24/2023 06:59 AM    CO2 25 02/24/2023 06:59 AM    BUN 15 02/24/2023 06:59 AM    CREATININE <0.5 02/24/2023 06:59 AM    CALCIUM 9.3 02/24/2023 06:59 AM         Significant Diagnostic Studies    Radiology:   CT CHEST PULMONARY EMBOLISM W CONTRAST   Final Result   1. No pulmonary embolism. 2. Dependent ground-glass opacities in the lower lobes favored to represent   atelectasis. Pneumonitis and atypical infection are considered less likely. XR CHEST PORTABLE   Final Result   No radiographic evidence of an acute cardiopulmonary process. COPD. Consults:     None    Disposition: Home    Condition at Discharge: Stable    Discharge Instructions/Follow-up: Follow-up with the PCP within a week    Code Status: Full code    Activity: activity as tolerated    Diet: regular diet      Discharge Medications:     Discharge Medication List as of 2/26/2023  1:59 PM             Details   amLODIPine (NORVASC) 10 MG tablet Take 1 tablet by mouth daily, Disp-30 tablet, [id number]      folic acid (FOLVITE) 1 MG tablet Take 1 tablet by mouth daily, Disp-30 tablet, R-3Normal      metoprolol succinate (TOPROL XL) 25 MG extended release tablet Take 1 tablet by mouth daily, Disp-30 tablet, R-3Normal      pantoprazole (PROTONIX) 40 MG tablet Take 1 tablet by mouth every morning (before breakfast), Disp-30 tablet, R-3Normal      thiamine 100 MG tablet Take 1 tablet by mouth daily, Disp-30 tablet, R-3Normal                Details   aspirin 81 MG chewable tablet Take by mouth      Calcium-Vitamin D 500-100 MG-UNIT WAFR Take by mouth      cetirizine (ZYRTEC) 10 MG tablet Take by mouth      fenofibrate micronized (ANTARA) 130 MG capsule Take 130 mg by mouth      losartan-hydrochlorothiazide (HYZAAR) 100-12.5 MG per tablet 1 tablet TAKE TWO TABLETS BY MOUTH DAILYHistorical Med             Time Spent on discharge: 45 minutes in the examination, evaluation, counseling and review of medications and discharge plan. Signed:    Heraclio Lynn MD   3/5/2023      Thank you Leisa Santillan MD for the opportunity to be involved in this patient's care. If you have any questions or concerns, please feel free to contact me at [phone].

## 2023-07-30 ENCOUNTER — APPOINTMENT (OUTPATIENT)
Dept: CT IMAGING | Age: 83
End: 2023-07-30
Payer: MEDICARE

## 2023-07-30 ENCOUNTER — HOSPITAL ENCOUNTER (EMERGENCY)
Age: 83
Discharge: HOME | End: 2023-07-30
Attending: EMERGENCY MEDICINE
Payer: MEDICARE

## 2023-07-30 ENCOUNTER — APPOINTMENT (OUTPATIENT)
Dept: GENERAL RADIOLOGY | Age: 83
End: 2023-07-30
Payer: MEDICARE

## 2023-07-30 VITALS
WEIGHT: 165.3 LBS | BODY MASS INDEX: 33.32 KG/M2 | RESPIRATION RATE: 15 BRPM | DIASTOLIC BLOOD PRESSURE: 53 MMHG | HEIGHT: 59 IN | HEART RATE: 84 BPM | OXYGEN SATURATION: 97 % | TEMPERATURE: 97.5 F | SYSTOLIC BLOOD PRESSURE: 124 MMHG

## 2023-07-30 DIAGNOSIS — M25.511 ACUTE PAIN OF RIGHT SHOULDER: ICD-10-CM

## 2023-07-30 DIAGNOSIS — R11.0 NAUSEA: ICD-10-CM
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
## 2023-07-30 LAB
ALBUMIN SERPL-MCNC: 4.1 G/DL (ref 3.4–5)
ALBUMIN/GLOB SERPL: 1.5 {RATIO} (ref 1.1–2.2)
ALP SERPL-CCNC: 164 U/L (ref 40–129)
ALT SERPL-CCNC: 15 U/L (ref 10–40)
ANION GAP SERPL CALCULATED.3IONS-SCNC: 12 MMOL/L (ref 3–16)
AST SERPL-CCNC: 23 U/L (ref 15–37)
BASOPHILS # BLD: 0.1 K/UL (ref 0–0.2)
BASOPHILS NFR BLD: 1.2 %
BILIRUB SERPL-MCNC: <0.2 MG/DL (ref 0–1)
BUN SERPL-MCNC: 22 MG/DL (ref 7–20)
CALCIUM SERPL-MCNC: 9.5 MG/DL (ref 8.3–10.6)
CHLORIDE SERPL-SCNC: 99 MMOL/L (ref 99–110)
CO2 SERPL-SCNC: 25 MMOL/L (ref 21–32)
CREAT SERPL-MCNC: 0.8 MG/DL (ref 0.6–1.2)
DEPRECATED RDW RBC AUTO: 14.2 % (ref 12.4–15.4)
EOSINOPHIL # BLD: 0.4 K/UL (ref 0–0.6)
EOSINOPHIL NFR BLD: 7.2 %
ETHANOLAMINE SERPL-MCNC: NORMAL MG/DL (ref 0–0.08)
GFR SERPLBLD CREATININE-BSD FMLA CKD-EPI: >60 ML/MIN/{1.73_M2}
GLUCOSE SERPL-MCNC: 145 MG/DL (ref 70–99)
HCT VFR BLD AUTO: 38 % (ref 36–48)
HGB BLD-MCNC: 12.8 G/DL (ref 12–16)
LYMPHOCYTES # BLD: 1.3 K/UL (ref 1–5.1)
LYMPHOCYTES NFR BLD: 23.9 %
MCH RBC QN AUTO: 31 PG (ref 26–34)
MCHC RBC AUTO-ENTMCNC: 33.8 G/DL (ref 31–36)
MCV RBC AUTO: 91.9 FL (ref 80–100)
MONOCYTES # BLD: 0.4 K/UL (ref 0–1.3)
MONOCYTES NFR BLD: 7.1 %
NEUTROPHILS # BLD: 3.4 K/UL (ref 1.7–7.7)
NEUTROPHILS NFR BLD: 60.6 %
PLATELET # BLD AUTO: 311 K/UL (ref 135–450)
PMV BLD AUTO: 7 FL (ref 5–10.5)
POTASSIUM SERPL-SCNC: 4.1 MMOL/L (ref 3.5–5.1)
PROT SERPL-MCNC: 6.9 G/DL (ref 6.4–8.2)
RBC # BLD AUTO: 4.13 M/UL (ref 4–5.2)
SODIUM SERPL-SCNC: 136 MMOL/L (ref 136–145)
TROPONIN, HIGH SENSITIVITY: 12 NG/L (ref 0–14)
TROPONIN, HIGH SENSITIVITY: 14 NG/L (ref 0–14)
WBC # BLD AUTO: 5.6 K/UL (ref 4–11)

## 2023-07-30 PROCEDURE — 93005 ELECTROCARDIOGRAM TRACING: CPT | Performed by: EMERGENCY MEDICINE

## 2023-07-30 PROCEDURE — 99285 EMERGENCY DEPT VISIT HI MDM: CPT

## 2023-07-30 PROCEDURE — 70450 CT HEAD/BRAIN W/O DYE: CPT

## 2023-07-30 PROCEDURE — 80053 COMPREHEN METABOLIC PANEL: CPT

## 2023-07-30 PROCEDURE — 72125 CT NECK SPINE W/O DYE: CPT

## 2023-07-30 PROCEDURE — 96374 THER/PROPH/DIAG INJ IV PUSH: CPT

## 2023-07-30 PROCEDURE — 6360000002 HC RX W HCPCS: Performed by: EMERGENCY MEDICINE

## 2023-07-30 PROCEDURE — 84484 ASSAY OF TROPONIN QUANT: CPT

## 2023-07-30 PROCEDURE — 85025 COMPLETE CBC W/AUTO DIFF WBC: CPT

## 2023-07-30 PROCEDURE — 82077 ASSAY SPEC XCP UR&BREATH IA: CPT

## 2023-07-30 PROCEDURE — 73030 X-RAY EXAM OF SHOULDER: CPT

## 2023-07-30 RX ORDER — ONDANSETRON 2 MG/ML
4 INJECTION INTRAMUSCULAR; INTRAVENOUS ONCE
Status: COMPLETED | OUTPATIENT
Start: 2023-07-30 | End: 2023-07-30

## 2023-07-30 RX ORDER — ONDANSETRON 4 MG/1
4 TABLET, ORALLY DISINTEGRATING ORAL 3 TIMES DAILY PRN
Qty: 21 TABLET | Refills: 0 | Status: SHIPPED | OUTPATIENT
Start: 2023-07-30

## 2023-07-30 RX ADMIN — ONDANSETRON 4 MG: 2 INJECTION INTRAMUSCULAR; INTRAVENOUS at 20:54

## 2023-07-30 ASSESSMENT — PAIN DESCRIPTION - FREQUENCY: FREQUENCY: CONTINUOUS

## 2023-07-30 ASSESSMENT — PAIN - FUNCTIONAL ASSESSMENT: PAIN_FUNCTIONAL_ASSESSMENT: 0-10

## 2023-07-30 ASSESSMENT — PAIN DESCRIPTION - DESCRIPTORS: DESCRIPTORS: ACHING;SORE;OTHER (COMMENT)

## 2023-07-30 ASSESSMENT — PAIN DESCRIPTION - LOCATION: LOCATION: SHOULDER

## 2023-07-30 ASSESSMENT — PAIN DESCRIPTION - ONSET: ONSET: ON-GOING

## 2023-07-30 ASSESSMENT — PAIN DESCRIPTION - ORIENTATION: ORIENTATION: RIGHT

## 2023-07-30 ASSESSMENT — PAIN DESCRIPTION - PAIN TYPE: TYPE: SURGICAL PAIN;ACUTE PAIN

## 2023-07-30 ASSESSMENT — PAIN SCALES - GENERAL: PAINLEVEL_OUTOF10: 5

## 2023-07-31 LAB
EKG ATRIAL RATE: 76 BPM
EKG DIAGNOSIS: NORMAL
EKG P AXIS: 62 DEGREES
EKG P-R INTERVAL: 180 MS
EKG Q-T INTERVAL: 404 MS
EKG QRS DURATION: 90 MS
EKG QTC CALCULATION (BAZETT): 454 MS
EKG R AXIS: -27 DEGREES
EKG T AXIS: 43 DEGREES
EKG VENTRICULAR RATE: 76 BPM

## 2023-07-31 NOTE — ED NOTES
Patient prepared for and ready to be discharged. Patient discharged at this time in no acute distress after verbalizing understanding of discharge instructions. Patient left after receiving After Visit Summary instructions.       Milena Giles RN  07/30/23 2021

## 2023-07-31 NOTE — ED PROVIDER NOTES
[facility]          ATTENDING PHYSICIAN NOTE       Date of evaluation: 7/30/2023    Chief Complaint     Fall (Patient out at family party and tripped over step and fell forward, + hit right side of head per patient, denies loc, + dizzy and nausea since )      History of Present Illness     Anand George is a 78 y.o. female who presents to the emergency department with complaints of lightheadedness, nausea, right shoulder pain, after a fall with head trauma. The patient states that she was at a party and had had a drink or 2. She was walking and did not realize that there was a step between rooms, so missed the step, and fell. She states that she struck the right side of her head on the wall, and then fell to the ground. She denies loss of consciousness, but does state that she has a right-sided headache, feels quite woozy although denies ray vertigo, and feels nauseous, although she has had no vomiting. She does recall that she had surgery on her right shoulder a few weeks ago, and feels that the pain in that shoulder is worse since the fall, although she does not recall any specific injury to the shoulder. She otherwise denies any pain in her neck, back, chest, abdomen. She denies any pain in the remainder of her extremities aside from her right shoulder. She was able to walk a few steps when she was helped to her feet at the party, and did not have pain with ambulation, although she felt quite woozy. Patient denies any preceding chest pain, shortness of breath, palpitations, dizziness or wooziness, vertigo, changes in vision, headache, or other symptoms preceding the fall, and describes it as a purely mechanical fall. Review of Systems     Review of Systems   All other systems reviewed and are negative. Past Medical, Surgical, Family, and Social History     She has a past medical history of Arthritis, Hyperlipidemia, and Hypertension.   She has a past